# Patient Record
Sex: FEMALE | Race: WHITE | Employment: FULL TIME | URBAN - NONMETROPOLITAN AREA
[De-identification: names, ages, dates, MRNs, and addresses within clinical notes are randomized per-mention and may not be internally consistent; named-entity substitution may affect disease eponyms.]

---

## 2018-01-10 ENCOUNTER — HOSPITAL ENCOUNTER (OUTPATIENT)
Dept: WOMENS IMAGING | Age: 43
Discharge: HOME | End: 2018-01-10
Payer: COMMERCIAL

## 2018-01-10 DIAGNOSIS — Z12.31 VISIT FOR SCREENING MAMMOGRAM: ICD-10-CM

## 2018-01-10 PROCEDURE — 77063 BREAST TOMOSYNTHESIS BI: CPT

## 2019-01-28 ENCOUNTER — HOSPITAL ENCOUNTER (OUTPATIENT)
Dept: WOMENS IMAGING | Age: 44
Discharge: HOME | End: 2019-01-28
Payer: COMMERCIAL

## 2019-01-28 DIAGNOSIS — Z12.31 VISIT FOR SCREENING MAMMOGRAM: ICD-10-CM

## 2019-01-28 PROCEDURE — 77067 SCR MAMMO BI INCL CAD: CPT

## 2020-02-24 ENCOUNTER — HOSPITAL ENCOUNTER (OUTPATIENT)
Dept: WOMENS IMAGING | Age: 45
Discharge: HOME | End: 2020-02-24
Payer: COMMERCIAL

## 2020-02-24 PROCEDURE — 77063 BREAST TOMOSYNTHESIS BI: CPT

## 2021-03-01 ENCOUNTER — HOSPITAL ENCOUNTER (OUTPATIENT)
Dept: WOMENS IMAGING | Age: 46
Discharge: HOME | End: 2021-03-01
Payer: COMMERCIAL

## 2021-03-01 DIAGNOSIS — Z12.31 VISIT FOR SCREENING MAMMOGRAM: ICD-10-CM

## 2021-03-01 PROCEDURE — 77063 BREAST TOMOSYNTHESIS BI: CPT

## 2021-03-03 ENCOUNTER — HOSPITAL ENCOUNTER (OUTPATIENT)
Dept: WOMENS IMAGING | Age: 46
Discharge: HOME | End: 2021-03-03
Payer: COMMERCIAL

## 2021-03-03 DIAGNOSIS — R92.2 BREAST DENSITY: ICD-10-CM

## 2021-03-03 PROCEDURE — 76642 ULTRASOUND BREAST LIMITED: CPT

## 2021-03-04 ENCOUNTER — HOSPITAL ENCOUNTER (OUTPATIENT)
Dept: WOMENS IMAGING | Age: 46
Discharge: HOME | End: 2021-03-04
Payer: COMMERCIAL

## 2021-03-04 DIAGNOSIS — N63.20 BREAST MASS, LEFT: ICD-10-CM

## 2021-03-04 PROCEDURE — C1894 INTRO/SHEATH, NON-LASER: HCPCS

## 2021-03-04 PROCEDURE — 88305 TISSUE EXAM BY PATHOLOGIST: CPT

## 2021-03-04 PROCEDURE — 77065 DX MAMMO INCL CAD UNI: CPT

## 2021-03-04 RX ORDER — DILTIAZEM HYDROCHLORIDE 180 MG/1
180 CAPSULE, COATED, EXTENDED RELEASE ORAL DAILY
COMMUNITY

## 2021-03-04 RX ORDER — LISINOPRIL 10 MG/1
10 TABLET ORAL DAILY
COMMUNITY

## 2021-03-04 RX ORDER — ATORVASTATIN CALCIUM 20 MG/1
28 TABLET, FILM COATED ORAL DAILY
COMMUNITY

## 2021-03-04 NOTE — PROGRESS NOTES
Formulation and discussion of sedation / procedure plans, risks, benefits, side effects and alternatives with patient and/or responsible adult completed.     Electronically signed by Genesis Strickland MD on 3/4/2021 at 1:09 PM

## 2021-03-04 NOTE — PROGRESS NOTES
[facility] [address]  Pre-Biopsy Assessment      Patient Education    Written information about procedure Yes  left   Procedural steps explained Yes Ultrasound Biopsy   Post-op potential: bruising, hematoma, pain Yes    Self-care: activity, care of dressing Yes    Patient verbalized understanding Yes    Consent signed and witnessed Yes      Hormone Therapy Status: N/A    Recent Medication: Ibuprofen Last Dose: 3-3-21                                     Hormone Replacement Therapy: no    Previous Breast Biopsy: no    Previous Diagnosis Cancer: no    Hysterectomy:no    Emotional Status: Calm    Language or Physical Barriers: N/A    Comments: N/A      Electronically signed by Argenis Barriga on 3/4/2021 at 1:39 PM

## 2021-03-04 NOTE — PROGRESS NOTES
Breast Biopsy Flowsheet/Post-Operative Care    Date of Procedure: 3/4/2021  Physician: Dr. Bharat Florez  Technologist: Justina Da Silva RT(R)(M)    Biopsy:ultrasound guided breast biopsy  Lesion type: Non-palpable  Breast: left    Clock face position: Site #1: UOQ         Primary Method of Detection: Mammogram      Microcalcification's: no   Distribution: N/A    Asymmetry: asymmetric    Biopsy Method:   Sertera:    Site # 1    Gauge: 14    # of Passes: 4     Clip: Radha      Pre-Op Assessment: (BI-RADS)   4. Suspicious Abnormality    Patient Tolerated Procedure: good  Complications: N/A  Comments: N/A    Post Operative Care  Steri strips: Yes  Dressing: Gauze, Tape   Ice Applied to Site:  No  Evidence of Bleeding:  No    Pain Verbalized: No      Written Discharge Instructions: Yes  Condition at Discharge: good  Time of Discharge: 1335    Electronically signed by Giuliano Capellan on 3/4/2021 at 1:40 PM

## 2021-03-05 ENCOUNTER — CLINICAL DOCUMENTATION (OUTPATIENT)
Dept: WOMENS IMAGING | Age: 46
End: 2021-03-05

## 2021-03-05 NOTE — PROGRESS NOTES
Debbi Litten (Legal)     39 y.o., 1975            Contact Type: [facility]  Contact Information: Arrived alone for biopsy results. Dr. Nakia Newman discussed pathology and recommends excisional biopsy. All cards given for surgeons. Diagnosis: Intraductal Papilloma  Patient Expresses Need(s) For: wanting to ask her aunt about surgeons. Requests Referral to Doctor(s) with appointment(s): Undecided  Additional Referral(s): Randy Thomson/Audra Nava: [facility] for high risk status  Biopsy site status: Good  Updated Tyrer-Cuzick Score, if applicable: 02.8  Teaching sheets provided: Excisional biopsy, Needle localization, Papilloma  Notes: Explained terms doctor and navigators will discuss at next appointments. Questions addressed. Referral faxed to Navigation due to high risk status. Answered yes to any section on Hereditary Risk Assessment: yes  Additional information: N/A  Results faxed to:  Dr. Bobbi Estrella and Dr. Raulito Vogel.

## 2021-03-08 ENCOUNTER — CLINICAL DOCUMENTATION (OUTPATIENT)
Dept: WOMENS IMAGING | Age: 46
End: 2021-03-08

## 2021-03-08 NOTE — PROGRESS NOTES
Telephone :  Patient called back today at 12:50 pm.  She would like to see Dr. Ravinder Pineda regarding the left breast papilloma. I will make referral today for a consult appointment. Patient voices understanding.

## 2021-03-15 ENCOUNTER — OFFICE VISIT (OUTPATIENT)
Dept: SURGERY | Age: 46
End: 2021-03-15
Payer: COMMERCIAL

## 2021-03-15 VITALS
BODY MASS INDEX: 41.95 KG/M2 | WEIGHT: 293 LBS | HEART RATE: 68 BPM | RESPIRATION RATE: 15 BRPM | TEMPERATURE: 97.2 F | OXYGEN SATURATION: 98 % | DIASTOLIC BLOOD PRESSURE: 67 MMHG | HEIGHT: 70 IN | SYSTOLIC BLOOD PRESSURE: 116 MMHG

## 2021-03-15 DIAGNOSIS — D24.2 INTRADUCTAL PAPILLOMA OF LEFT BREAST: Primary | ICD-10-CM

## 2021-03-15 DIAGNOSIS — I10 ESSENTIAL HYPERTENSION: ICD-10-CM

## 2021-03-15 DIAGNOSIS — Z01.818 PRE-OP TESTING: ICD-10-CM

## 2021-03-15 DIAGNOSIS — E11.9 DIABETES MELLITUS WITHOUT COMPLICATION (HCC): ICD-10-CM

## 2021-03-15 PROCEDURE — G8484 FLU IMMUNIZE NO ADMIN: HCPCS | Performed by: SURGERY

## 2021-03-15 PROCEDURE — G8427 DOCREV CUR MEDS BY ELIG CLIN: HCPCS | Performed by: SURGERY

## 2021-03-15 PROCEDURE — 99243 OFF/OP CNSLTJ NEW/EST LOW 30: CPT | Performed by: SURGERY

## 2021-03-15 PROCEDURE — 2022F DILAT RTA XM EVC RTNOPTHY: CPT | Performed by: SURGERY

## 2021-03-15 PROCEDURE — G8417 CALC BMI ABV UP PARAM F/U: HCPCS | Performed by: SURGERY

## 2021-03-15 SDOH — HEALTH STABILITY: MENTAL HEALTH: HOW OFTEN DO YOU HAVE A DRINK CONTAINING ALCOHOL?: NOT ASKED

## 2021-03-15 SDOH — ECONOMIC STABILITY: FOOD INSECURITY: WITHIN THE PAST 12 MONTHS, THE FOOD YOU BOUGHT JUST DIDN'T LAST AND YOU DIDN'T HAVE MONEY TO GET MORE.: NOT ASKED

## 2021-03-15 SDOH — ECONOMIC STABILITY: FOOD INSECURITY: WITHIN THE PAST 12 MONTHS, YOU WORRIED THAT YOUR FOOD WOULD RUN OUT BEFORE YOU GOT MONEY TO BUY MORE.: NOT ASKED

## 2021-03-15 SDOH — ECONOMIC STABILITY: TRANSPORTATION INSECURITY
IN THE PAST 12 MONTHS, HAS THE LACK OF TRANSPORTATION KEPT YOU FROM MEDICAL APPOINTMENTS OR FROM GETTING MEDICATIONS?: NOT ASKED

## 2021-03-15 ASSESSMENT — ENCOUNTER SYMPTOMS
VOMITING: 0
COUGH: 0
COLOR CHANGE: 0
WHEEZING: 0
NAUSEA: 0
SORE THROAT: 0
SHORTNESS OF BREATH: 0
BLOOD IN STOOL: 0
ABDOMINAL PAIN: 0

## 2021-03-15 NOTE — LETTER
[address] [facility]  [place] [address] [facility] [address]  Phone: [phone]  Fax: [phone]    Monisha Roche MD        March 15, 2021    Patient: Jennifer Cabral   MR Number: [id number]   YOB: 1975   Date of Visit: 3/15/2021     Dear Dr. Ktahy Decker,    Thank you for the request for consultation for Jennifer Cabral to me for the evaluation of recent abnormal left mammogram and biopsy. Below are the relevant portions of my assessment and plan of care. 1. Intraductal papilloma of left breast    2. Diabetes mellitus without complication ([facility] [address]    3. Pre-op testing    4. Essential hypertension    4. Ectatic duct associated with intraductal papilloma    1. Schedule patient for needle localization lumpectomy of the left breast.  Excision recommended by radiology discordant findings  2. MAC anesthesia  3. Preoperative testing per anesthesia guidelines  4. Risk of procedure were discussed with the patient and include but not limited to bleeding, infection, hematoma as well as seroma and scar. Discussed with patient intraductal papilloma in and of itself is benign however excision recommended if any discordant findings. Associated ectatic duct. If you have questions, please do not hesitate to call me. I look forward to following Jossie along with you.     Sincerely,          Monisha Roche MD

## 2021-03-15 NOTE — PROGRESS NOTES
Dante Montague MD   General Surgery  New Patient Evaluation in Office  Pt Name: Afshan Roblero  Date of Birth 1975   Today's Date: 3/15/2021  Medical Record Number: [id number]  Referring Provider: Linden Collins MD  Primary Care Provider: Janey Kim  Chief Complaint:  Chief Complaint   Patient presents with    Surgical Consult     new patient--ref by [address] for left breast intraductal papilloma-needs excisional bx       ASSESSMENT      1. Intraductal papilloma of left breast    2. Diabetes mellitus without complication ([address]    3. Pre-op testing    4. Essential hypertension    4. Ectatic duct associated with intraductal papilloma   5. Increased lifetime risk of invasive breast cancer utilizing screening tool  6. BMI 49.93  PLANS      1. Schedule patient for needle localization lumpectomy of the left breast.  Excision recommended by radiology discordant findings  2. MAC anesthesia  3. Preoperative testing per anesthesia guidelines  4. Risk of procedure were discussed with the patient and include but not limited to bleeding, infection, hematoma as well as seroma and scar. Discussed with patient intraductal papilloma in and of itself is benign however excision recommended if any discordant findings. Associated ectatic duct. Ajay Mcintyre is a 39y.o. year old female who is presenting today in the office for evaluation after recent abnormal screening mammogram and subsequent ultrasound guided biopsy. States she has had no previous breast symptoms and has not had a previous breast biopsy. She has been getting regular screening mammograms since age 36. On recent screen revealed an irregular ectatic duct in the left breast with an indistinct margin. This prompted an ultrasound which showed an area of duct ectasia in the upper outer anterior aspect of the left breast.  Near this there was an irregularly-shaped nodule 8 x 3 x 6 mm.   There were no abnormality seen in the left axilla. An ultrasound-guided biopsy was performed. Pathology revealed intraductal papilloma with usual intraductal hyperplasia. However no breast tissue lobules were seen. No atypical epithelium was seen. Given patient's family history and larger size of the lesion at 8 mm I concur with recommendation for excision. Past Medical History  Past Medical History:   Diagnosis Date    Diabetes mellitus ([address]     type 2    High blood pressure     Sleep apnea        Past Surgical History  Past Surgical History:   Procedure Laterality Date     SECTION      KNEE SURGERY      [facility] US GUID NDL BIOPSY LEFT Left 2021    [facility] US GUID NDL BIOPSY LEFT 3/4/2021 [facility]    SINUS SURGERY         Medications  Current Outpatient Medications   Medication Sig Dispense Refill    dilTIAZem (DILTIAZEM CD) 180 MG extended release capsule Take 180 mg by mouth daily      lisinopril (PRINIVIL;ZESTRIL) 10 MG tablet Take 10 mg by mouth daily      atorvastatin (LIPITOR) 20 MG tablet Take 28 mg by mouth daily      metFORMIN (GLUCOPHAGE) 1000 MG tablet Take 1,000 mg by mouth 2 times daily (with meals).  sertraline (ZOLOFT) 50 MG tablet Take 50 mg by mouth daily. No current facility-administered medications for this visit.       Allergies     Allergies   Allergen Reactions    Sulfa Antibiotics Swelling       Family History  Family History   Problem Relation Age of Onset    High Blood Pressure Mother     High Blood Pressure Father     Breast Cancer Maternal Aunt 61    Breast Cancer Maternal Grandmother 67    Breast Cancer Paternal Grandmother 55       SocialHistory  Social History     Socioeconomic History    Marital status:      Spouse name: Not on file    Number of children: 1    Years of education: Not on file    Highest education level: Not on file   Occupational History    Not on file   Social Needs    Financial resource strain: Not on file    Food insecurity Worry: Not on file     Inability: Not on file    Transportation needs     Medical: Not on file     Non-medical: Not on file   Tobacco Use    Smoking status: Never Smoker    Smokeless tobacco: Never Used   Substance and Sexual Activity    Alcohol use: Yes     Comment: occasional    Drug use: Never    Sexual activity: Yes     Partners: Male   Lifestyle    Physical activity     Days per week: Not on file     Minutes per session: Not on file    Stress: Not on file   Relationships    Social connections     Talks on phone: Not on file     Gets together: Not on file     Attends Faith service: Not on file     Active member of club or organization: Not on file     Attends meetings of clubs or organizations: Not on file     Relationship status: Not on file    Intimate partner violence     Fear of current or ex partner: Not on file     Emotionally abused: Not on file     Physically abused: Not on file     Forced sexual activity: Not on file   Other Topics Concern    Not on file   Social History Narrative    Not on file           Review of Systems  Review of Systems   Constitutional: Negative for chills, fatigue, fever and unexpected weight change. Weight gain   HENT: Negative for sore throat. Eyes: Negative for visual disturbance. Respiratory: Negative for cough, shortness of breath and wheezing. Cardiovascular: Negative for chest pain and palpitations. Gastrointestinal: Negative for abdominal pain, blood in stool, nausea and vomiting. Endocrine: Negative for cold intolerance, heat intolerance and polydipsia. Genitourinary: Negative for dysuria, flank pain and hematuria. Musculoskeletal: Negative for arthralgias, gait problem, joint swelling and myalgias. Skin: Negative for color change and rash. Allergic/Immunologic: Negative for immunocompromised state. Neurological: Negative for dizziness, tremors, seizures and speech difficulty. Hematological: Does not bruise/bleed easily. Psychiatric/Behavioral: Negative for behavioral problems and confusion. OBJECTIVE     /67 (Site: Left Lower Arm, Position: Sitting, Cuff Size: Medium Adult)   Pulse 68   Temp 97.2 °F (36.2 °C) (Tympanic)   Resp 15   Ht 5' 10\" (1.778 m)   Wt (!) 348 lb (157.9 kg)   LMP 02/14/2021 (Approximate)   SpO2 98%   Breastfeeding No   BMI 49.93 kg/m²      Physical Exam  Constitutional:       Appearance: Normal appearance. She is well-developed. She is obese. She is not ill-appearing or diaphoretic. HENT:      Head: Normocephalic and atraumatic. Nose: No congestion. Eyes:      General: No scleral icterus. Extraocular Movements: Extraocular movements intact. Pupils: Pupils are equal, round, and reactive to light. Neck:      Musculoskeletal: Neck supple. Vascular: No JVD. Trachea: No tracheal deviation. Cardiovascular:      Rate and Rhythm: Normal rate and regular rhythm. Heart sounds: Normal heart sounds. No murmur. Pulmonary:      Effort: Pulmonary effort is normal. No respiratory distress. Breath sounds: No wheezing. Chest:      Chest wall: No tenderness. Breasts:         Right: No inverted nipple, mass, nipple discharge, skin change or tenderness. Left: No inverted nipple, mass, nipple discharge, skin change or tenderness. Abdominal:      General: There is no distension. Palpations: There is no mass. Tenderness: There is no abdominal tenderness. Musculoskeletal:         General: No deformity. Lymphadenopathy:      Cervical: No cervical adenopathy. Right cervical: No superficial, deep or posterior cervical adenopathy. Left cervical: No superficial, deep or posterior cervical adenopathy. Upper Body:      Right upper body: No supraclavicular, axillary or pectoral adenopathy. Left upper body: No axillary or pectoral adenopathy. Skin:     General: Skin is warm and dry. Coloration: Skin is not jaundiced or pale. Examination:   The core fragment demonstrates an intraductal papilloma with usual   intraductal hyperplasia.  Fibrovascular cores are well-defined.  The   epithelium is not atypical.  No breast lobules are present in the core   biopsy.  Please clinically correlate with mammographic findings. [id number]                                                       <Sign Out Dr. Jo Vazquez M.D., F.C.A.P. NVML/ [address] [facility]  Printed on:  3/5/2021   Delia Maurer [address]   [address] [facility], [facility]   Original print date: 03/05/2021              IMPRESSION: Mammogram BI-RADS: Category 0: Incomplete: Needs    Additional Imaging Evaluation       The irregular ectatic duct in the left breast appears    indeterminate.  An ultrasound is recommended.             Joy Phi calculations report this patient's 10-year risk for    developing breast cancer at 6.1% and lifetime risk at 30.4%. Based on this assessment tool, this patient's calculated lifetime   Ardella Bream is at or above 20%  and they may be a candidate for Breast    MRI screening per the [address]    The patient has been or will be contacted.         #[id number] - ARNAV JEN DIGITAL SCREEN SELF REFERRAL W OR WO CAD    BILATERAL   BILATERAL DIGITAL SCREENING MAMMOGRAM 3D/2D WITH CAD: 3/1/2021   CLINICAL: Tomosynthesis.  Self referred screening mammogram.         Comparison is made to exams dated:  2/24/2020 mammogram and    1/28/2019 mammogram - [address] [place] [address].     There are scattered fibroglandular elements in both breasts that    could obscure a lesion on mammography.     Current study was also evaluated with a Computer Aided Detection    (CAD) system.     There is an irregular ectatic duct with an indistinct margin in    the left breast upper outer aspect anterior depth.  This is more    prominent.     No other significant masses, calcifications, or other findings    are seen in either breast.                 Dr. Mishra Mention             nn/:3/1/2021 12:34:03     copy to: Saumya Laboy M.D., [facility],    [address], ph: [phone], fax: [phone]       Imaging Technologist: Hilda Mendieta, [facility]   letter sent: Additional Tests Needed     [id number]            IMPRESSION: Ultrasound BI-RADS: Category 4B: Suspicious    Abnormality: Moderate suspicion for malignancy   The irregular nodule with adjacent duct ectasia in the left    breast appear to have an intermediate suspicion for malignancy.     An ultrasound guided biopsy is recommended.     The results were reviewed with the patient.     The patient has been or will be contacted.         #[id number] - US BREAST LIMITED LEFT   ULTRASOUND OF LEFT BREAST AND LEFT AXILLA: 3/3/2021   CLINICAL: Rt breast density.         Comparison is made to exam dated:  3/1/2021 mammogram - [address]     Real-time ultrasound of the left breast and axilla was performed    on the areas of interest.  Gray scale images of the real-time    examination were reviewed.     There is an area of duct ectasia in the left breast upper outer    aspect anterior depth.  These duct ectasia display posterior    acoustic enhancement.  These correlate with mammography findings.     Near this, there is an irregularly shaped nodule measuring 8 x    3 x 6 mm.       No abnormalities were seen sonographically in the left axilla.         Dr. Mishra Mention     nn/:3/3/2021 11:47:15     copy to: Saumya Laboy M.D., [facility],    [address], ph: [phone], fax: [phone]       Imaging Technologist: Robyn Curry RT(R)(M), [address] [place] [address]   letter sent: Additional Tests Needed        [id number]               Imaging reviewed with patient

## 2021-03-30 ENCOUNTER — HOSPITAL ENCOUNTER (OUTPATIENT)
Age: 46
Discharge: HOME | End: 2021-03-30
Payer: COMMERCIAL

## 2021-03-30 DIAGNOSIS — E11.9 DIABETES MELLITUS WITHOUT COMPLICATION (HCC): ICD-10-CM

## 2021-03-30 DIAGNOSIS — Z01.818 PRE-OP TESTING: ICD-10-CM

## 2021-03-30 DIAGNOSIS — D24.2 INTRADUCTAL PAPILLOMA OF LEFT BREAST: ICD-10-CM

## 2021-03-30 LAB
ANION GAP SERPL CALCULATED.3IONS-SCNC: 12 MEQ/L (ref 8–16)
BUN BLDV-MCNC: 12 MG/DL (ref 7–22)
CALCIUM SERPL-MCNC: 9.7 MG/DL (ref 8.5–10.5)
CHLORIDE BLD-SCNC: 104 MEQ/L (ref 98–111)
CO2: 21 MEQ/L (ref 23–33)
CREAT SERPL-MCNC: 0.5 MG/DL (ref 0.4–1.2)
EKG ATRIAL RATE: 57 BPM
EKG P AXIS: 55 DEGREES
EKG P-R INTERVAL: 152 MS
EKG Q-T INTERVAL: 432 MS
EKG QRS DURATION: 88 MS
EKG QTC CALCULATION (BAZETT): 420 MS
EKG R AXIS: 27 DEGREES
EKG T AXIS: -5 DEGREES
EKG VENTRICULAR RATE: 57 BPM
GFR SERPL CREATININE-BSD FRML MDRD: > 90 ML/MIN/1.73M2
GLUCOSE BLD-MCNC: 142 MG/DL (ref 70–108)
HCT VFR BLD CALC: 44 % (ref 37–47)
HEMOGLOBIN: 13.4 GM/DL (ref 12–16)
POTASSIUM SERPL-SCNC: 4.4 MEQ/L (ref 3.5–5.2)
SODIUM BLD-SCNC: 137 MEQ/L (ref 135–145)

## 2021-03-30 PROCEDURE — 36415 COLL VENOUS BLD VENIPUNCTURE: CPT

## 2021-03-30 PROCEDURE — 85018 HEMOGLOBIN: CPT

## 2021-03-30 PROCEDURE — 93010 ELECTROCARDIOGRAM REPORT: CPT | Performed by: INTERNAL MEDICINE

## 2021-03-30 PROCEDURE — 80048 BASIC METABOLIC PNL TOTAL CA: CPT

## 2021-03-30 PROCEDURE — 93005 ELECTROCARDIOGRAM TRACING: CPT

## 2021-03-30 PROCEDURE — 85014 HEMATOCRIT: CPT

## 2021-04-02 ENCOUNTER — HOSPITAL ENCOUNTER (OUTPATIENT)
Age: 46
Discharge: HOME | End: 2021-04-02
Payer: COMMERCIAL

## 2021-04-02 PROCEDURE — U0003 INFECTIOUS AGENT DETECTION BY NUCLEIC ACID (DNA OR RNA); SEVERE ACUTE RESPIRATORY SYNDROME CORONAVIRUS 2 (SARS-COV-2) (CORONAVIRUS DISEASE [COVID-19]), AMPLIFIED PROBE TECHNIQUE, MAKING USE OF HIGH THROUGHPUT TECHNOLOGIES AS DESCRIBED BY CMS-2020-01-R: HCPCS

## 2021-04-02 PROCEDURE — U0005 INFEC AGEN DETEC AMPLI PROBE: HCPCS

## 2021-04-05 LAB
SARS-COV-2: NOT DETECTED
SOURCE: NORMAL

## 2021-04-09 ENCOUNTER — ANESTHESIA (OUTPATIENT)
Dept: OPERATING ROOM | Age: 46
End: 2021-04-09
Payer: COMMERCIAL

## 2021-04-09 ENCOUNTER — HOSPITAL ENCOUNTER (OUTPATIENT)
Dept: WOMENS IMAGING | Age: 46
Discharge: HOME | End: 2021-04-09
Attending: SURGERY
Payer: COMMERCIAL

## 2021-04-09 ENCOUNTER — HOSPITAL ENCOUNTER (OUTPATIENT)
Dept: WOMENS IMAGING | Age: 46
Discharge: HOME | End: 2021-04-09
Payer: COMMERCIAL

## 2021-04-09 ENCOUNTER — ANESTHESIA EVENT (OUTPATIENT)
Dept: OPERATING ROOM | Age: 46
End: 2021-04-09
Payer: COMMERCIAL

## 2021-04-09 ENCOUNTER — HOSPITAL ENCOUNTER (OUTPATIENT)
Age: 46
Setting detail: OUTPATIENT SURGERY
Discharge: HOME | End: 2021-04-09
Attending: SURGERY | Admitting: SURGERY
Payer: COMMERCIAL

## 2021-04-09 VITALS
RESPIRATION RATE: 10 BRPM | SYSTOLIC BLOOD PRESSURE: 123 MMHG | DIASTOLIC BLOOD PRESSURE: 61 MMHG | OXYGEN SATURATION: 94 %

## 2021-04-09 VITALS
DIASTOLIC BLOOD PRESSURE: 67 MMHG | HEIGHT: 69 IN | OXYGEN SATURATION: 97 % | SYSTOLIC BLOOD PRESSURE: 129 MMHG | RESPIRATION RATE: 16 BRPM | WEIGHT: 293 LBS | HEART RATE: 66 BPM | TEMPERATURE: 98 F | BODY MASS INDEX: 43.4 KG/M2

## 2021-04-09 DIAGNOSIS — D24.2 PAPILLOMA OF LEFT BREAST: ICD-10-CM

## 2021-04-09 DIAGNOSIS — D24.2 INTRADUCTAL PAPILLOMA OF LEFT BREAST: ICD-10-CM

## 2021-04-09 DIAGNOSIS — D24.2 PAPILLOMA OF LEFT BREAST: Primary | ICD-10-CM

## 2021-04-09 LAB
GLUCOSE BLD-MCNC: 120 MG/DL (ref 70–108)
PREGNANCY, URINE: NEGATIVE

## 2021-04-09 PROCEDURE — 82948 REAGENT STRIP/BLOOD GLUCOSE: CPT

## 2021-04-09 PROCEDURE — G0279 TOMOSYNTHESIS, MAMMO: HCPCS

## 2021-04-09 PROCEDURE — 7100000011 HC PHASE II RECOVERY - ADDTL 15 MIN: Performed by: SURGERY

## 2021-04-09 PROCEDURE — 3600000012 HC SURGERY LEVEL 2 ADDTL 15MIN: Performed by: SURGERY

## 2021-04-09 PROCEDURE — 6370000000 HC RX 637 (ALT 250 FOR IP)

## 2021-04-09 PROCEDURE — 2500000003 HC RX 250 WO HCPCS: Performed by: NURSE ANESTHETIST, CERTIFIED REGISTERED

## 2021-04-09 PROCEDURE — 2709999900 HC NON-CHARGEABLE SUPPLY: Performed by: SURGERY

## 2021-04-09 PROCEDURE — 3700000001 HC ADD 15 MINUTES (ANESTHESIA): Performed by: SURGERY

## 2021-04-09 PROCEDURE — 88307 TISSUE EXAM BY PATHOLOGIST: CPT

## 2021-04-09 PROCEDURE — 2500000003 HC RX 250 WO HCPCS: Performed by: SURGERY

## 2021-04-09 PROCEDURE — 76098 X-RAY EXAM SURGICAL SPECIMEN: CPT

## 2021-04-09 PROCEDURE — 2580000003 HC RX 258: Performed by: SURGERY

## 2021-04-09 PROCEDURE — 19301 PARTIAL MASTECTOMY: CPT | Performed by: SURGERY

## 2021-04-09 PROCEDURE — 81025 URINE PREGNANCY TEST: CPT

## 2021-04-09 PROCEDURE — 3600000002 HC SURGERY LEVEL 2 BASE: Performed by: SURGERY

## 2021-04-09 PROCEDURE — 19285 PERQ DEV BREAST 1ST US IMAG: CPT

## 2021-04-09 PROCEDURE — 7100000010 HC PHASE II RECOVERY - FIRST 15 MIN: Performed by: SURGERY

## 2021-04-09 PROCEDURE — 6360000002 HC RX W HCPCS: Performed by: NURSE ANESTHETIST, CERTIFIED REGISTERED

## 2021-04-09 PROCEDURE — 6360000002 HC RX W HCPCS: Performed by: SURGERY

## 2021-04-09 PROCEDURE — 3700000000 HC ANESTHESIA ATTENDED CARE: Performed by: SURGERY

## 2021-04-09 RX ORDER — ONDANSETRON 2 MG/ML
INJECTION INTRAMUSCULAR; INTRAVENOUS PRN
Status: DISCONTINUED | OUTPATIENT
Start: 2021-04-09 | End: 2021-04-09 | Stop reason: SDUPTHER

## 2021-04-09 RX ORDER — MIDAZOLAM HYDROCHLORIDE 2 MG/2ML
INJECTION, SOLUTION INTRAMUSCULAR; INTRAVENOUS PRN
Status: DISCONTINUED | OUTPATIENT
Start: 2021-04-09 | End: 2021-04-09 | Stop reason: SDUPTHER

## 2021-04-09 RX ORDER — DEXAMETHASONE SODIUM PHOSPHATE 10 MG/ML
INJECTION, EMULSION INTRAMUSCULAR; INTRAVENOUS PRN
Status: DISCONTINUED | OUTPATIENT
Start: 2021-04-09 | End: 2021-04-09 | Stop reason: SDUPTHER

## 2021-04-09 RX ORDER — SODIUM CHLORIDE 9 MG/ML
25 INJECTION, SOLUTION INTRAVENOUS PRN
Status: DISCONTINUED | OUTPATIENT
Start: 2021-04-09 | End: 2021-04-09 | Stop reason: HOSPADM

## 2021-04-09 RX ORDER — SODIUM CHLORIDE 0.9 % (FLUSH) 0.9 %
5-40 SYRINGE (ML) INJECTION EVERY 12 HOURS SCHEDULED
Status: DISCONTINUED | OUTPATIENT
Start: 2021-04-09 | End: 2021-04-09 | Stop reason: HOSPADM

## 2021-04-09 RX ORDER — PROPOFOL 10 MG/ML
INJECTION, EMULSION INTRAVENOUS CONTINUOUS PRN
Status: DISCONTINUED | OUTPATIENT
Start: 2021-04-09 | End: 2021-04-09 | Stop reason: SDUPTHER

## 2021-04-09 RX ORDER — SCOLOPAMINE TRANSDERMAL SYSTEM 1 MG/1
PATCH, EXTENDED RELEASE TRANSDERMAL
Status: COMPLETED
Start: 2021-04-09 | End: 2021-04-09

## 2021-04-09 RX ORDER — HYDROCODONE BITARTRATE AND ACETAMINOPHEN 5; 325 MG/1; MG/1
1 TABLET ORAL EVERY 6 HOURS PRN
Qty: 12 TABLET | Refills: 0 | Status: SHIPPED | OUTPATIENT
Start: 2021-04-09 | End: 2021-04-12

## 2021-04-09 RX ORDER — MORPHINE SULFATE 2 MG/ML
4 INJECTION, SOLUTION INTRAMUSCULAR; INTRAVENOUS
Status: DISCONTINUED | OUTPATIENT
Start: 2021-04-09 | End: 2021-04-09 | Stop reason: HOSPADM

## 2021-04-09 RX ORDER — PROPOFOL 10 MG/ML
INJECTION, EMULSION INTRAVENOUS PRN
Status: DISCONTINUED | OUTPATIENT
Start: 2021-04-09 | End: 2021-04-09 | Stop reason: SDUPTHER

## 2021-04-09 RX ORDER — FENTANYL CITRATE 50 UG/ML
INJECTION, SOLUTION INTRAMUSCULAR; INTRAVENOUS PRN
Status: DISCONTINUED | OUTPATIENT
Start: 2021-04-09 | End: 2021-04-09 | Stop reason: SDUPTHER

## 2021-04-09 RX ORDER — BUPIVACAINE HYDROCHLORIDE 5 MG/ML
INJECTION, SOLUTION EPIDURAL; INTRACAUDAL PRN
Status: DISCONTINUED | OUTPATIENT
Start: 2021-04-09 | End: 2021-04-09 | Stop reason: ALTCHOICE

## 2021-04-09 RX ORDER — PROMETHAZINE HYDROCHLORIDE 25 MG/1
12.5 TABLET ORAL EVERY 6 HOURS PRN
Status: DISCONTINUED | OUTPATIENT
Start: 2021-04-09 | End: 2021-04-09 | Stop reason: HOSPADM

## 2021-04-09 RX ORDER — LIDOCAINE HYDROCHLORIDE AND EPINEPHRINE 10; 10 MG/ML; UG/ML
INJECTION, SOLUTION INFILTRATION; PERINEURAL PRN
Status: DISCONTINUED | OUTPATIENT
Start: 2021-04-09 | End: 2021-04-09 | Stop reason: ALTCHOICE

## 2021-04-09 RX ORDER — SODIUM CHLORIDE 9 MG/ML
INJECTION, SOLUTION INTRAVENOUS CONTINUOUS
Status: DISCONTINUED | OUTPATIENT
Start: 2021-04-09 | End: 2021-04-09 | Stop reason: HOSPADM

## 2021-04-09 RX ORDER — HYDROCODONE BITARTRATE AND ACETAMINOPHEN 5; 325 MG/1; MG/1
2 TABLET ORAL EVERY 4 HOURS PRN
Status: DISCONTINUED | OUTPATIENT
Start: 2021-04-09 | End: 2021-04-09 | Stop reason: HOSPADM

## 2021-04-09 RX ORDER — ONDANSETRON 2 MG/ML
4 INJECTION INTRAMUSCULAR; INTRAVENOUS EVERY 6 HOURS PRN
Status: DISCONTINUED | OUTPATIENT
Start: 2021-04-09 | End: 2021-04-09 | Stop reason: HOSPADM

## 2021-04-09 RX ORDER — SODIUM CHLORIDE 0.9 % (FLUSH) 0.9 %
5-40 SYRINGE (ML) INJECTION PRN
Status: DISCONTINUED | OUTPATIENT
Start: 2021-04-09 | End: 2021-04-09 | Stop reason: HOSPADM

## 2021-04-09 RX ORDER — SCOLOPAMINE TRANSDERMAL SYSTEM 1 MG/1
1 PATCH, EXTENDED RELEASE TRANSDERMAL ONCE
Status: DISCONTINUED | OUTPATIENT
Start: 2021-04-09 | End: 2021-04-09 | Stop reason: HOSPADM

## 2021-04-09 RX ORDER — MORPHINE SULFATE 2 MG/ML
2 INJECTION, SOLUTION INTRAMUSCULAR; INTRAVENOUS
Status: DISCONTINUED | OUTPATIENT
Start: 2021-04-09 | End: 2021-04-09 | Stop reason: HOSPADM

## 2021-04-09 RX ORDER — ACETAMINOPHEN 325 MG/1
650 TABLET ORAL EVERY 4 HOURS PRN
Status: DISCONTINUED | OUTPATIENT
Start: 2021-04-09 | End: 2021-04-09 | Stop reason: HOSPADM

## 2021-04-09 RX ORDER — HYDROCODONE BITARTRATE AND ACETAMINOPHEN 5; 325 MG/1; MG/1
1 TABLET ORAL EVERY 4 HOURS PRN
Status: DISCONTINUED | OUTPATIENT
Start: 2021-04-09 | End: 2021-04-09 | Stop reason: HOSPADM

## 2021-04-09 RX ORDER — LIDOCAINE HYDROCHLORIDE 20 MG/ML
INJECTION, SOLUTION EPIDURAL; INFILTRATION; INTRACAUDAL; PERINEURAL PRN
Status: DISCONTINUED | OUTPATIENT
Start: 2021-04-09 | End: 2021-04-09 | Stop reason: SDUPTHER

## 2021-04-09 RX ADMIN — PROPOFOL 40 MG: 10 INJECTION, EMULSION INTRAVENOUS at 13:42

## 2021-04-09 RX ADMIN — MIDAZOLAM HYDROCHLORIDE 2 MG: 1 INJECTION, SOLUTION INTRAMUSCULAR; INTRAVENOUS at 13:36

## 2021-04-09 RX ADMIN — FENTANYL CITRATE 50 MCG: 50 INJECTION, SOLUTION INTRAMUSCULAR; INTRAVENOUS at 13:55

## 2021-04-09 RX ADMIN — FENTANYL CITRATE 25 MCG: 50 INJECTION, SOLUTION INTRAMUSCULAR; INTRAVENOUS at 14:00

## 2021-04-09 RX ADMIN — ONDANSETRON HYDROCHLORIDE 4 MG: 4 INJECTION, SOLUTION INTRAMUSCULAR; INTRAVENOUS at 14:03

## 2021-04-09 RX ADMIN — Medication 3000 MG: at 13:42

## 2021-04-09 RX ADMIN — FENTANYL CITRATE 50 MCG: 50 INJECTION, SOLUTION INTRAMUSCULAR; INTRAVENOUS at 13:39

## 2021-04-09 RX ADMIN — LIDOCAINE HYDROCHLORIDE 80 MG: 20 INJECTION, SOLUTION EPIDURAL; INFILTRATION; INTRACAUDAL; PERINEURAL at 13:41

## 2021-04-09 RX ADMIN — PROPOFOL 100 MCG/KG/MIN: 10 INJECTION, EMULSION INTRAVENOUS at 13:42

## 2021-04-09 RX ADMIN — DEXAMETHASONE SODIUM PHOSPHATE 10 MG: 10 INJECTION, EMULSION INTRAMUSCULAR; INTRAVENOUS at 13:49

## 2021-04-09 RX ADMIN — SODIUM CHLORIDE: 9 INJECTION, SOLUTION INTRAVENOUS at 13:36

## 2021-04-09 ASSESSMENT — PULMONARY FUNCTION TESTS
PIF_VALUE: 0
PIF_VALUE: 1

## 2021-04-09 NOTE — PROGRESS NOTES
Formulation and discussion of sedation / procedure plans, risks, benefits, side effects and alternatives with patient and/or responsible adult completed.     Electronically signed by Jhonny Warner MD on 4/9/2021 at 11:59 AM

## 2021-04-09 NOTE — BRIEF OP NOTE
Brief Postoperative Note      Patient: Sherri Miramontes  YOB: 1975  MRN: [id number]    Date of Procedure: 4/9/2021    Pre-Op Diagnosis: LEFT BREAST PAPILLOMA, discordant imaging    Post-Op Diagnosis: Same       Procedure(s):  LEFT BREAST LUMPECTOMY, PRE-OP NEEDLE LOC    Surgeon(s):  Hannah Cabrera MD    Assistant:  * No surgical staff found *    Anesthesia: Monitor Anesthesia Care    Estimated Blood Loss (mL): Minimal    Complications: None    Specimens:   ID Type Source Tests Collected by Time Destination   A : LEFT BREAST LUMP Tissue Breast SURGICAL [address], MD 4/9/2021 4395        Implants:  * No implants in log *      Drains: * No LDAs found *    Findings:     Electronically signed by Hannah Cabrera MD on 4/9/2021 at 2:12 PM

## 2021-04-09 NOTE — ANESTHESIA PRE PROCEDURE
Department of Anesthesiology  Preprocedure Note       Name:  Leny Gomez   Age:  39 y.o.  :  1975                                          MRN:  [id number]         Date:  2021      Surgeon: Suresh Guardado):  Deejay Hartmann MD    Procedure: Procedure(s):  LEFT BREAST LUMPECTOMY, PRE-OP NEEDLE LOC    Medications prior to admission:   Prior to Admission medications    Medication Sig Start Date End Date Taking? Authorizing Provider   dilTIAZem (DILTIAZEM CD) 180 MG extended release capsule Take 180 mg by mouth daily   Yes Historical Provider, MD   lisinopril (PRINIVIL;ZESTRIL) 10 MG tablet Take 10 mg by mouth daily   Yes Historical Provider, MD   atorvastatin (LIPITOR) 20 MG tablet Take 28 mg by mouth daily   Yes Historical Provider, MD   sertraline (ZOLOFT) 50 MG tablet Take 50 mg by mouth daily. Yes Historical Provider, MD   metFORMIN (GLUCOPHAGE) 1000 MG tablet Take 1,000 mg by mouth 2 times daily (with meals). Historical Provider, MD       Current medications:    Current Facility-Administered Medications   Medication Dose Route Frequency Provider Last Rate Last Admin    0.9 % sodium chloride infusion   Intravenous Continuous Deejay Hartmann MD        sodium chloride flush 0.9 % injection 5-40 mL  5-40 mL Intravenous 2 times per day Deejay Hartmann MD        sodium chloride flush 0.9 % injection 5-40 mL  5-40 mL Intravenous PRN Deejay Hartmann MD        0.9 % sodium chloride infusion  25 mL Intravenous PRN Deejay Hartmann MD        ceFAZolin (ANCEF) 3000 mg in dextrose 5 % 100 mL IVPB  3,000 mg Intravenous On Call to [address] [place] [address], MD           Allergies: Allergies   Allergen Reactions    Sulfa Antibiotics Swelling       Problem List:  There is no problem list on file for this patient.       Past Medical History:        Diagnosis Date    Diabetes mellitus ([address]     type 2    High blood pressure     Sleep apnea        Past Surgical History:        Procedure Laterality Date     SECTION      KNEE SURGERY  2018    [facility] US GUID NDL BIOPSY LEFT Left 03/04/2021    [address] LEFT 3/4/2021 [facility]    SINUS SURGERY         Social History:    Social History     Tobacco Use    Smoking status: Never Smoker    Smokeless tobacco: Never Used   Substance Use Topics    Alcohol use: Yes     Comment: occasional                                Counseling given: Not Answered      Vital Signs (Current):   Vitals:    04/09/21 1251   BP: (!) 146/64   Pulse: 65   Resp: 16   Temp: 97.9 °F (36.6 °C)   TempSrc: Temporal   SpO2: 98%   Weight: (!) 348 lb 3.2 oz (157.9 kg)   Height: 5' 9\" (1.753 m)                                              BP Readings from Last 3 Encounters:   04/09/21 (!) 146/64   03/15/21 116/67   09/22/16 174/79       NPO Status: Time of last liquid consumption: 1200(sip )                        Time of last solid consumption: 2200                        Date of last liquid consumption: 04/09/21                        Date of last solid food consumption: 04/08/21    BMI:   Wt Readings from Last 3 Encounters:   04/09/21 (!) 348 lb 3.2 oz (157.9 kg)   03/15/21 (!) 348 lb (157.9 kg)   09/22/16 (!) 342 lb (155.1 kg)     Body mass index is 51.42 kg/m². CBC:   Lab Results   Component Value Date    HGB 13.4 03/30/2021    HCT 44.0 03/30/2021       CMP:   Lab Results   Component Value Date     03/30/2021    K 4.4 03/30/2021     03/30/2021    CO2 21 03/30/2021    BUN 12 03/30/2021    CREATININE 0.5 03/30/2021    LABGLOM >90 03/30/2021    GLUCOSE 142 03/30/2021    CALCIUM 9.7 03/30/2021       POC Tests: No results for input(s): POCGLU, POCNA, POCK, POCCL, POCBUN, POCHEMO, POCHCT in the last 72 hours.     Coags: No results found for: PROTIME, INR, APTT    HCG (If Applicable):   Lab Results   Component Value Date    PREGTESTUR negative 04/09/2021        ABGs: No results found for: PHART, PO2ART, QIU0UKM, RZK5GHH, BEART, O3GPRJEL     Type & Screen (If Applicable):  No results found for: Cornell Mar    Drug/Infectious Status (If Applicable):  No results found for: HIV, HEPCAB    COVID-19 Screening (If Applicable):   Lab Results   Component Value Date    COVID19 Not Detected 04/02/2021           Anesthesia Evaluation    Airway: Mallampati: II  TM distance: >3 FB   Neck ROM: full  Mouth opening: > = 3 FB Dental:          Pulmonary: breath sounds clear to auscultation  (+) sleep apnea:                             Cardiovascular:    (+) hypertension:,         Rhythm: regular                      Neuro/Psych:               GI/Hepatic/Renal:   (+) morbid obesity          Endo/Other:    (+) Diabetes, . Abdominal:   (+) obese,         Vascular:                                        Anesthesia Plan      MAC     ASA 3     (Possible GA)  Induction: intravenous. MIPS: Postoperative opioids intended and Prophylactic antiemetics administered. Anesthetic plan and risks discussed with patient. Plan discussed with CRNA.                   Samira De La Garza MD   4/9/2021

## 2021-04-09 NOTE — H&P
and has not had a previous breast biopsy. She has been getting regular screening mammograms since age 36. On recent screen revealed an irregular ectatic duct in the left breast with an indistinct margin. This prompted an ultrasound which showed an area of duct ectasia in the upper outer anterior aspect of the left breast.  Near this there was an irregularly-shaped nodule 8 x 3 x 6 mm. There were no abnormality seen in the left axilla. An ultrasound-guided biopsy was performed. Pathology revealed intraductal papilloma with usual intraductal hyperplasia. However no breast tissue lobules were seen. No atypical epithelium was seen. Given patient's family history and larger size of the lesion at 8 mm I concur with recommendation for excision.     Past Medical History  Past Medical History        Past Medical History:   Diagnosis Date    Diabetes mellitus ([address]       type 2    High blood pressure      Sleep apnea            Past Surgical History  Past Surgical History         Past Surgical History:   Procedure Laterality Date     SECTION        KNEE SURGERY        OIO    [facility] US GUID NDL BIOPSY LEFT Left 2021     [facility] US GUID NDL BIOPSY LEFT 3/4/2021 [facility]    SINUS SURGERY              Medications  Current Facility-Administered Medications          Current Outpatient Medications   Medication Sig Dispense Refill    dilTIAZem (DILTIAZEM CD) 180 MG extended release capsule Take 180 mg by mouth daily        lisinopril (PRINIVIL;ZESTRIL) 10 MG tablet Take 10 mg by mouth daily        atorvastatin (LIPITOR) 20 MG tablet Take 28 mg by mouth daily        metFORMIN (GLUCOPHAGE) 1000 MG tablet Take 1,000 mg by mouth 2 times daily (with meals).         sertraline (ZOLOFT) 50 MG tablet Take 50 mg by mouth daily.          No current facility-administered medications for this visit.          Allergies          Allergies   Allergen Reactions    Sulfa Antibiotics Swelling       Family History  Family History         Family History   Problem Relation Age of Onset    High Blood Pressure Mother      High Blood Pressure Father      Breast Cancer Maternal Aunt 61    Breast Cancer Maternal Grandmother 67    Breast Cancer Paternal Grandmother 55          SocialHistory  Social History               Socioeconomic History    Marital status:        Spouse name: Not on file    Number of children: 1    Years of education: Not on file    Highest education level: Not on file   Occupational History    Not on file   Social Needs    Financial resource strain: Not on file    Food insecurity       Worry: Not on file       Inability: Not on file    Transportation needs       Medical: Not on file       Non-medical: Not on file   Tobacco Use    Smoking status: Never Smoker    Smokeless tobacco: Never Used   Substance and Sexual Activity    Alcohol use: Yes       Comment: occasional    Drug use: Never    Sexual activity: Yes       Partners: Male   Lifestyle    Physical activity       Days per week: Not on file       Minutes per session: Not on file    Stress: Not on file   Relationships    Social connections       Talks on phone: Not on file       Gets together: Not on file       Attends Church service: Not on file       Active member of club or organization: Not on file       Attends meetings of clubs or organizations: Not on file       Relationship status: Not on file    Intimate partner violence       Fear of current or ex partner: Not on file       Emotionally abused: Not on file       Physically abused: Not on file       Forced sexual activity: Not on file   Other Topics Concern    Not on file   Social History Narrative    Not on file              Review of Systems  Review of Systems   Constitutional: Negative for chills, fatigue, fever and unexpected weight change. Weight gain   HENT: Negative for sore throat. Eyes: Negative for visual disturbance.    Respiratory: discharge, skin change or tenderness. Abdominal:      General: There is no distension. Palpations: There is no mass. Tenderness: There is no abdominal tenderness. Musculoskeletal:         General: No deformity. Lymphadenopathy:      Cervical: No cervical adenopathy. Right cervical: No superficial, deep or posterior cervical adenopathy. Left cervical: No superficial, deep or posterior cervical adenopathy. Upper Body:      Right upper body: No supraclavicular, axillary or pectoral adenopathy. Left upper body: No axillary or pectoral adenopathy. Skin:     General: Skin is warm and dry. Coloration: Skin is not jaundiced or pale. Findings: No bruising or rash. Neurological:      General: No focal deficit present. Mental Status: She is alert and oriented to person, place, and time. Cranial Nerves: No cranial nerve deficit. Psychiatric:         Mood and Affect: Mood normal.         Behavior: Behavior normal.         Thought Content:  Thought content normal.            No results found for: WBC, HGB, HCT, PLT, CHOL, TRIG, HDL, LDLDIRECT, ALT, AST, NA, K, CL, CREATININE, BUN, CO2, TSH, PSA, INR, GLUF, LABA1C, LABMICR     Performed by: [address] [place] [address]. Pathology     Geoff Pond                  [id number]   Assoc.                                              Page 1 of 1   [address]   [place], [place] [address]                                                       PROC: 2021   [facility]                                    RECV: 2021   [facility]                                    RPTD: 2021   [place], [place] [address]                       MRN:  [id number]    LOC: [facility]                       ACCT: [id number]  SEX: F                       : 1975  AGE: 45 Y                          PATHOLOGY REPORT                       ATTN: ISHA FIELD                       REQ: Opal Bearden       Copies To:   KALYANI CONNOR; Tracy Pang Clinical Information: LEFT BREAST MASS     FINAL DIAGNOSIS:   Left breast mass, upper outer quadrant, biopsy:    Intraductal papilloma with usual intraductal hyperplasia. Specimen:   CORE NEEDLE BREAST BIOPSY, LEFT, UOQ, MASS, TRIBELL CLIP       Gross Examination:   The container is labeled Bishnu Balloon, left upper outer quadrant,   tribell.  Received in formalin are several cylindrical and fragmented   bits of yellow-white tissue aggregating to 0.9 x 0.8 x 0.1 cm.   1 ns. EKM/DKR:[id number]     Fixative:  10% neutral buffered formalin   Tissue removal time:  13:22   Tissue fixation time:  13:23   Total fixation time: 6 hours and 37 minutes     Microscopic Examination:   The core fragment demonstrates an intraductal papilloma with usual   intraductal hyperplasia.  Fibrovascular cores are well-defined.  The   epithelium is not atypical.  No breast lobules are present in the core   biopsy.  Please clinically correlate with mammographic findings. [id number]                                                       <Sign Out Dr. Vitaly Bernardo M.D., F.C.A.P. NVML/ [address] [address]  Printed on:  3/5/2021   Delia Maurer [facility]   Luis Butler, [email or web address] [facility]   Original print date: 03/05/2021                 IMPRESSION: Mammogram BI-RADS: Category 0: Incomplete: Needs    Additional Imaging Evaluation       The irregular ectatic duct in the left breast appears    indeterminate.  An ultrasound is recommended.             damonica Inmoopaolo calculations report this patient's 10-year risk for    developing breast cancer at 6.1% and lifetime risk at 30.4%. Based on this assessment tool, this patient's calculated lifetime   Izzy Arroyo is at or above 20%  and they may be a candidate for Breast    MRI screening per the [address]    The patient has been or will be contacted.         #[id number] - ARNAV JEN DIGITAL SCREEN SELF REFERRAL W OR WO CAD    BILATERAL BILATERAL DIGITAL SCREENING MAMMOGRAM 3D/2D WITH CAD: 3/1/2021   CLINICAL: Tomosynthesis.  Self referred screening mammogram.         Comparison is made to exams dated:  2/24/2020 mammogram and    1/28/2019 mammogram - [facility].     There are scattered fibroglandular elements in both breasts that    could obscure a lesion on mammography.     Current study was also evaluated with a Computer Aided Detection    (CAD) system.     There is an irregular ectatic duct with an indistinct margin in    the left breast upper outer aspect anterior depth.  This is more    prominent.     No other significant masses, calcifications, or other findings    are seen in either breast.                 Dr. Miguel Reynolds             nn/:3/1/2021 12:34:03     copy to: Talia Ledesma M.D., [place] [address],    [address], ph: [phone], fax: [phone]       Imaging Technologist: Marsh Dakins, [address] [address]   letter sent: Additional Tests Needed     [id number]              IMPRESSION: Ultrasound BI-RADS: Category 4B: Suspicious    Abnormality: Moderate suspicion for malignancy   The irregular nodule with adjacent duct ectasia in the left    breast appear to have an intermediate suspicion for malignancy.     An ultrasound guided biopsy is recommended.     The results were reviewed with the patient.     The patient has been or will be contacted.         #[id number] - US BREAST LIMITED LEFT   ULTRASOUND OF LEFT BREAST AND LEFT AXILLA: 3/3/2021   CLINICAL: Rt breast density.         Comparison is made to exam dated:  3/1/2021 mammogram - [address]     Real-time ultrasound of the left breast and axilla was performed    on the areas of interest.  Gray scale images of the real-time    examination were reviewed.     There is an area of duct ectasia in the left breast upper outer    aspect anterior depth.  These duct ectasia display posterior    acoustic enhancement.  These correlate with mammography findings.     Near this, there is an irregularly shaped nodule measuring 8 x    3 x 6 mm.       No abnormalities were seen sonographically in the left axilla.         Dr. Rhoda Gonzalez     nn/:3/3/2021 11:47:15     copy to: Sabino Deluna M.D., [place] [address],    [address], ph: [phone], fax: [phone]       Imaging Technologist: Subha Rhoades RT(R)(M), [address] [place] [address]   letter sent: Additional Tests Needed        [id number]

## 2021-04-10 NOTE — OP NOTE
[address] [place] [address]                                OPERATIVE REPORT    PATIENT NAME: Sonia Duong                    :        1975  MED REC NO:   [id number]                           ROOM:  ACCOUNT NO:   [de-identified]                           ADMIT DATE: 2021  PROVIDER:     Penelope Del Rio M.D.    DATE OF PROCEDURE:  2021    SURGEON:  Penelope Del Rio M.D. PREOPERATIVE DIAGNOSIS:  Left breast intraductal papilloma, left breast  mass, discordant imaging findings. POSTOPERATIVE DIAGNOSIS:  Left breast intraductal papilloma, left breast  mass, discordant imaging findings. PROCEDURE:  Needle localization lumpectomy of the left breast.    ANESTHESIA:  IV local sedation, monitored anesthesia care. ESTIMATED BLOOD LOSS:  Less than 10 mL. SPECIMEN:  Radiograph revealed mass, clip, and wire within the specimen. PROCEDURE IN DETAIL:  The patient was brought to the operating suite and  placed supine on the operating table after needle localization procedure  was performed in the [facility]. Procedure was performed  in the surgery center. She was given _____ gm Ancef intravenously. After appropriate sedation was given, time-out was performed. Left  breast and wire were prepped and draped in the usual sterile fashion. A  periareolar incision was made at the upper outer quadrant of the left  breast.  Dissection was carried down to the wire, and lumpectomy  surrounding the wire was performed sharply with Metzenbaum scissors. Hemostasis of the lumpectomy cavity was achieved with careful  electrocautery. Specimen was oriented with MarginMap. It was sent for  specimen radiograph containing mass, clip, and wire per my review. Dermis was closed with interrupted 3-0 Vicryl, suture and the skin was  closed with running subcuticular 4-0 Monocryl suture. Skin glue was  applied.   The patient was transported back to the phase II of the  recovery area in the surgery center. Helena Spencer M.D.    D: 04/09/2021 14:46:32       T: 04/09/2021 14:56:21     [id number]  Job#: [id number]     Doc#: [id number]    CC:  Kristal Pacheco.  Jane Goodrich M.D.

## 2021-04-12 ENCOUNTER — TELEPHONE (OUTPATIENT)
Dept: SURGERY | Age: 46
End: 2021-04-12

## 2021-04-12 NOTE — TELEPHONE ENCOUNTER
----- Message from Charla Clark MD sent at 4/12/2021  4:56 PM EDT -----  Benign may notify pt  ----- Message -----  From: Megan White Incoming Lab Results From Soft  Sent: 4/12/2021  12:42 PM EDT  To: Charla Clark MD

## 2021-04-12 NOTE — TELEPHONE ENCOUNTER
S/p 4/9 Needle localization lumpectomy of the left breast.    Attempted to contact pt post op- no answer- left appropriate VM with return phone number.

## 2021-04-21 NOTE — PROGRESS NOTES
Chay Duque MD  General Surgery  Postprocedure Evaluation in Office  Pt Name: Juan Ferguson  Date of Birth 1975   Today's Date: 4/22/2021  Medical Record Number: [id number]  Primary Care Provider: Lilly Avendaño  Chief Complaint   Patient presents with    Post-Op Check     s/p 4/9 Needle localization lumpectomy of the left breast.     ASSESSMENT      1. Intraductal papilloma of left breast    2. Postop check         PLANS       1. Pathology reviewed with the patient who understands. All questions were answered. Benign intraductal papilloma. No associated atypia. Biopsy site changes noted  2. Follow up: Return in about 6 months (around 10/22/2021). 3.  Call in interim with any questions or concerns. Ronak Romero is seen today for post-op follow-up. She is status post left partial mastectomy for left breast mass intraductal papilloma with discordant imaging findings. Left breast periareolar incision is healing well. No evidence of hematoma or seroma. Pathology discussed with patient. Benign. All questions answered. Medications    Current Outpatient Medications:     dilTIAZem (DILTIAZEM CD) 180 MG extended release capsule, Take 180 mg by mouth daily, Disp: , Rfl:     lisinopril (PRINIVIL;ZESTRIL) 10 MG tablet, Take 10 mg by mouth daily, Disp: , Rfl:     atorvastatin (LIPITOR) 20 MG tablet, Take 28 mg by mouth daily, Disp: , Rfl:     metFORMIN (GLUCOPHAGE) 1000 MG tablet, Take 1,000 mg by mouth 2 times daily (with meals). , Disp: , Rfl:     sertraline (ZOLOFT) 50 MG tablet, Take 50 mg by mouth daily. , Disp: , Rfl:   Allergies    Allergies   Allergen Reactions    Sulfa Antibiotics Swelling       Review of Systems  History obtained from the patient. Constitutional: Denies any fever, chills, fatigue. Wound: Denies any rash, skin color changes or wound problems. Resp: Denies any cough, shortness of breath. CV: Denies any chest pain, orthopnea or syncope.    GI: Denies any nausea, vomiting, blood in the stool, constipation or diarrhea. OBJECTIVE     VITALS: /72 (Site: Right Lower Arm, Position: Sitting, Cuff Size: Medium Adult)   Pulse 63   Temp 97.1 °F (36.2 °C) (Temporal)   Resp 20   Wt (!) 347 lb (157.4 kg)   LMP 2021   SpO2 98%   BMI 51.24 kg/m²     CONSTITUTIONAL: Alert and oriented times 3, no acute distress and cooperative to examination. SKIN: Skin color, texture, turgor normal. No rashes or lesions. INCISION: wound margins intact and healing well. No signs of infection. No drainage. NECK: Soft, trachea midline and straight  BREAST: Lumpectomy left No evidence of seroma or hematoma. LUNGS: Clear   CARDIOVASCULAR: Normal heart rate  NEUROLOGIC: No sensory or motor nerve irritation  EXTREMITIES: no cyanosis, no clubbing and no edema. Performed by: Santosh Rosario. Pathology     Selden Ranks                  [id number]   Assoc.                                              Page 1 of 0 [id number]   [place] [address] [address], [place] [address]                                                       PROC: 2021   [facility]                                    RECV: 04/10/2021   [address]                                    RPTD: 2021   [place] [address], [place] [address]                       MRN:  [id number]    LOC: [facility]                       ACCT: [de-identified]  SEX: F                       : 1975  AGE: 39 Y                          PATHOLOGY REPORT                       ATTN: CONNIE RIDDLE   [de-identified]                  REQ: Amy RIDDLE       Copies To:   JOHN MORGAN       Clinical Information: LEFT BREAST PAPILLOMA     FINAL DIAGNOSIS:   Breast, left, lumpectomy:    Intraductal papilloma with usual ductal hyperplasia and apocrine   metaplasia.     Fibrocystic changes including microcyst formation.    Changes consistent with previous biopsy site.    Negative for malignancy.      Specimen:   EXCISION OF BREAST, LEFT LUMP       Gross Examination:   The container is

## 2021-04-22 ENCOUNTER — OFFICE VISIT (OUTPATIENT)
Dept: SURGERY | Age: 46
End: 2021-04-22

## 2021-04-22 VITALS
HEART RATE: 63 BPM | SYSTOLIC BLOOD PRESSURE: 112 MMHG | BODY MASS INDEX: 51.24 KG/M2 | WEIGHT: 293 LBS | TEMPERATURE: 97.1 F | DIASTOLIC BLOOD PRESSURE: 72 MMHG | RESPIRATION RATE: 20 BRPM | OXYGEN SATURATION: 98 %

## 2021-04-22 DIAGNOSIS — D24.2 INTRADUCTAL PAPILLOMA OF LEFT BREAST: Primary | ICD-10-CM

## 2021-04-22 DIAGNOSIS — Z09 POSTOP CHECK: ICD-10-CM

## 2021-04-22 PROCEDURE — 99024 POSTOP FOLLOW-UP VISIT: CPT | Performed by: SURGERY

## 2021-04-22 NOTE — LETTER
[address] [facility]  [place] [address]  Phone: [phone]  Fax: [phone]    Brittny Bella MD        April 22, 2021    Amol Keller  [address] [facility] [place]  [address]    Patient: Khoi Hernadez   MR Number: [id number]   YOB: 1975   Date of Visit: 4/22/2021     Dear Amol Keller,    I recently saw your patient, Khoi Hernadez for a follow-up visit. Below are the relevant portions of my assessment and plan of care. 1. Intraductal papilloma of left breast    2. Postop check        1. Pathology reviewed with the patient who understands. All questions were answered. Benign intraductal papilloma. No associated atypia. Biopsy site changes noted  2. Follow up: Return in about 6 months (around 10/22/2021). 3.  Call in interim with any questions or concerns. If you have questions, please do not hesitate to call me. I look forward to following Jossie along with you.     Sincerely,          Brittny Bella MD

## 2021-05-18 ENCOUNTER — APPOINTMENT (OUTPATIENT)
Dept: CT IMAGING | Age: 46
End: 2021-05-18
Payer: COMMERCIAL

## 2021-05-18 ENCOUNTER — APPOINTMENT (OUTPATIENT)
Dept: GENERAL RADIOLOGY | Age: 46
End: 2021-05-18
Payer: COMMERCIAL

## 2021-05-18 ENCOUNTER — HOSPITAL ENCOUNTER (EMERGENCY)
Age: 46
Discharge: HOME | End: 2021-05-18
Attending: EMERGENCY MEDICINE
Payer: COMMERCIAL

## 2021-05-18 VITALS
TEMPERATURE: 98.9 F | DIASTOLIC BLOOD PRESSURE: 101 MMHG | OXYGEN SATURATION: 98 % | RESPIRATION RATE: 16 BRPM | HEART RATE: 66 BPM | SYSTOLIC BLOOD PRESSURE: 174 MMHG

## 2021-05-18 DIAGNOSIS — K59.00 CONSTIPATION, UNSPECIFIED CONSTIPATION TYPE: Primary | ICD-10-CM

## 2021-05-18 LAB
ALBUMIN SERPL-MCNC: 3.9 G/DL (ref 3.5–5.1)
ALP BLD-CCNC: 136 U/L (ref 38–126)
ALT SERPL-CCNC: 19 U/L (ref 11–66)
ANION GAP SERPL CALCULATED.3IONS-SCNC: 14 MEQ/L (ref 8–16)
AST SERPL-CCNC: 17 U/L (ref 5–40)
BASOPHILS # BLD: 0.4 %
BASOPHILS ABSOLUTE: 0.1 THOU/MM3 (ref 0–0.1)
BILIRUB SERPL-MCNC: 0.5 MG/DL (ref 0.3–1.2)
BILIRUBIN URINE: NEGATIVE
BLOOD, URINE: NEGATIVE
BUN BLDV-MCNC: 10 MG/DL (ref 7–22)
CALCIUM SERPL-MCNC: 9.5 MG/DL (ref 8.5–10.5)
CHARACTER, URINE: CLEAR
CHLORIDE BLD-SCNC: 105 MEQ/L (ref 98–111)
CO2: 19 MEQ/L (ref 23–33)
COLOR: YELLOW
CREAT SERPL-MCNC: 0.5 MG/DL (ref 0.4–1.2)
EOSINOPHIL # BLD: 11.7 %
EOSINOPHILS ABSOLUTE: 1.6 THOU/MM3 (ref 0–0.4)
ERYTHROCYTE [DISTWIDTH] IN BLOOD BY AUTOMATED COUNT: 13.4 % (ref 11.5–14.5)
ERYTHROCYTE [DISTWIDTH] IN BLOOD BY AUTOMATED COUNT: 43.8 FL (ref 35–45)
GFR SERPL CREATININE-BSD FRML MDRD: > 90 ML/MIN/1.73M2
GLUCOSE BLD-MCNC: 206 MG/DL (ref 70–108)
GLUCOSE URINE: 250 MG/DL
HCT VFR BLD CALC: 44.5 % (ref 37–47)
HEMOGLOBIN: 13.6 GM/DL (ref 12–16)
IMMATURE GRANS (ABS): 0.07 THOU/MM3 (ref 0–0.07)
IMMATURE GRANULOCYTES: 0.5 %
KETONES, URINE: NEGATIVE
LEUKOCYTE ESTERASE, URINE: NEGATIVE
LIPASE: 33.1 U/L (ref 5.6–51.3)
LYMPHOCYTES # BLD: 12.9 %
LYMPHOCYTES ABSOLUTE: 1.8 THOU/MM3 (ref 1–4.8)
MCH RBC QN AUTO: 27.7 PG (ref 26–33)
MCHC RBC AUTO-ENTMCNC: 30.6 GM/DL (ref 32.2–35.5)
MCV RBC AUTO: 90.6 FL (ref 81–99)
MONOCYTES # BLD: 5.3 %
MONOCYTES ABSOLUTE: 0.7 THOU/MM3 (ref 0.4–1.3)
NITRITE, URINE: NEGATIVE
NUCLEATED RED BLOOD CELLS: 0 /100 WBC
OSMOLALITY CALCULATION: 280.7 MOSMOL/KG (ref 275–300)
PH UA: 6 (ref 5–9)
PLATELET # BLD: 359 THOU/MM3 (ref 130–400)
PMV BLD AUTO: 9.2 FL (ref 9.4–12.4)
POTASSIUM SERPL-SCNC: 4.4 MEQ/L (ref 3.5–5.2)
PREGNANCY, SERUM: NEGATIVE
PROTEIN UA: NEGATIVE
RBC # BLD: 4.91 MILL/MM3 (ref 4.2–5.4)
SEG NEUTROPHILS: 69.2 %
SEGMENTED NEUTROPHILS ABSOLUTE COUNT: 9.5 THOU/MM3 (ref 1.8–7.7)
SODIUM BLD-SCNC: 138 MEQ/L (ref 135–145)
SPECIFIC GRAVITY, URINE: 1.02 (ref 1–1.03)
TOTAL PROTEIN: 8 G/DL (ref 6.1–8)
UROBILINOGEN, URINE: 0.2 EU/DL (ref 0–1)
WBC # BLD: 13.8 THOU/MM3 (ref 4.8–10.8)

## 2021-05-18 PROCEDURE — 84703 CHORIONIC GONADOTROPIN ASSAY: CPT

## 2021-05-18 PROCEDURE — 85025 COMPLETE CBC W/AUTO DIFF WBC: CPT

## 2021-05-18 PROCEDURE — 81003 URINALYSIS AUTO W/O SCOPE: CPT

## 2021-05-18 PROCEDURE — 74018 RADEX ABDOMEN 1 VIEW: CPT

## 2021-05-18 PROCEDURE — 80053 COMPREHEN METABOLIC PANEL: CPT

## 2021-05-18 PROCEDURE — 6370000000 HC RX 637 (ALT 250 FOR IP): Performed by: EMERGENCY MEDICINE

## 2021-05-18 PROCEDURE — 36415 COLL VENOUS BLD VENIPUNCTURE: CPT

## 2021-05-18 PROCEDURE — 99285 EMERGENCY DEPT VISIT HI MDM: CPT

## 2021-05-18 PROCEDURE — 83690 ASSAY OF LIPASE: CPT

## 2021-05-18 PROCEDURE — 74176 CT ABD & PELVIS W/O CONTRAST: CPT

## 2021-05-18 RX ADMIN — MAGNESIUM CITRATE 296 ML: 1.75 LIQUID ORAL at 22:05

## 2021-05-18 ASSESSMENT — ENCOUNTER SYMPTOMS
PHOTOPHOBIA: 0
EYE DISCHARGE: 0
COUGH: 0
EYE ITCHING: 0
VOMITING: 0
ABDOMINAL DISTENTION: 0
EYE PAIN: 0
BACK PAIN: 0
DIARRHEA: 0
CONSTIPATION: 1
EYE REDNESS: 0
NAUSEA: 1
CHEST TIGHTNESS: 0
ABDOMINAL PAIN: 1
SHORTNESS OF BREATH: 0
SORE THROAT: 0
RHINORRHEA: 0
STRIDOR: 0
WHEEZING: 0

## 2021-05-18 ASSESSMENT — PAIN SCALES - GENERAL: PAINLEVEL_OUTOF10: 5

## 2021-05-18 ASSESSMENT — PAIN DESCRIPTION - LOCATION: LOCATION: ABDOMEN

## 2021-05-18 ASSESSMENT — PAIN DESCRIPTION - PAIN TYPE: TYPE: ACUTE PAIN

## 2021-05-18 NOTE — ED PROVIDER NOTES
[facility]  EMERGENCY DEPARTMENT ENCOUNTER      PATIENT NAME: Al Alvarez  MRN: [id number]  : 1975  CINTRON: 2021  PROVIDER: Aldair Santana MD      CHIEF COMPLAINT       Chief Complaint   Patient presents with    Constipation       Patient is seen and evaluated in a timely fashion. Nurses Notes are reviewed and I agree except as noted in the HPI. HISTORY OF PRESENT ILLNESS    Jossie Rangel is a 39 y.o. female who presents to Emergency Department with Constipation    Onset: Gradual  Location: Abdomen  Quality: No BM, abdominal pain and nausea  Radiation: None  Severity: Mild  Timing: Last four days  Modifying factors: None  Duration: Persistent  Context: Past medical history remarkable for obesity, hypertension, MONTY, and type II DM. Patient is on weight watcher. Patient has chronic constipation. No good bowel movements for last 4 days. No active vomiting. Patient states she feels she needs to go all the time, but she only has small amount of liquid out. This HPI was provided by patient. REVIEW OF SYSTEMS   Review of Systems   Constitutional: Negative for activity change, appetite change, chills, fatigue, fever and unexpected weight change. HENT: Negative for congestion, ear discharge, ear pain, hearing loss, nosebleeds, rhinorrhea and sore throat. Eyes: Negative for photophobia, pain, discharge, redness and itching. Respiratory: Negative for cough, chest tightness, shortness of breath, wheezing and stridor. Cardiovascular: Negative for chest pain, palpitations and leg swelling. Gastrointestinal: Positive for abdominal pain, constipation and nausea. Negative for abdominal distention, diarrhea and vomiting. Endocrine: Negative for cold intolerance, heat intolerance, polydipsia and polyphagia. Genitourinary: Negative for dysuria, flank pain, frequency and hematuria.    Musculoskeletal: Negative for arthralgias, back pain, gait problem, myalgias, neck pain and neck stiffness. Skin: Negative for pallor, rash and wound. Allergic/Immunologic: Negative for environmental allergies and food allergies. Neurological: Negative for dizziness, tremors, syncope, weakness and headaches. Psychiatric/Behavioral: Negative for agitation, behavioral problems, confusion, self-injury, sleep disturbance and suicidal ideas. PAST MEDICAL HISTORY     Past Medical History:   Diagnosis Date    Diabetes mellitus ([address]     type 2    High blood pressure     Sleep apnea     wears cpap       SURGICAL HISTORY       Past Surgical History:   Procedure Laterality Date    BREAST LUMPECTOMY Left 4/9/2021    LEFT BREAST LUMPECTOMY, PRE-OP NEEDLE LOC performed by Penelope Del Rio MD at [facility]  2018    [facility] US GUID NDL BIOPSY LEFT Left 03/04/2021    ARNA[address] LEFT 3/4/2021 [address] GUIDED NEEDLE LOC OF LEFT BREAST Left 4/9/2021    US GUIDED NEEDLE LOC OF LEFT BREAST 4/9/2021 [facility]       CURRENT MEDICATIONS       Discharge Medication List as of 5/18/2021  9:57 PM      CONTINUE these medications which have NOT CHANGED    Details   dilTIAZem (DILTIAZEM CD) 180 MG extended release capsule Take 180 mg by mouth dailyHistorical Med      lisinopril (PRINIVIL;ZESTRIL) 10 MG tablet Take 10 mg by mouth dailyHistorical Med      atorvastatin (LIPITOR) 20 MG tablet Take 28 mg by mouth dailyHistorical Med      metFORMIN (GLUCOPHAGE) 1000 MG tablet Take 1,000 mg by mouth 2 times daily (with meals). sertraline (ZOLOFT) 50 MG tablet Take 50 mg by mouth daily. ALLERGIES     Sulfa antibiotics    FAMILY HISTORY     She indicated that her mother is alive. She indicated that her father is alive. She indicated that the status of her maternal grandmother is unknown. She indicated that the status of her paternal grandmother is unknown.  She indicated that the status of her maternal aunt is unknown.   family history includes Breast Cancer (age of onset: 55) in her paternal grandmother; Breast Cancer (age of onset: 61) in her maternal aunt; Breast Cancer (age of onset: 67) in her maternal grandmother; High Blood Pressure in her father and mother. SOCIAL HISTORY      reports that she has never smoked. She has never used smokeless tobacco. She reports current alcohol use. She reports that she does not use drugs. PHYSICAL EXAM      oral temperature is 98.9 °F (37.2 °C). Her blood pressure is 174/101 (abnormal) and her pulse is 66. Her respiration is 16 and oxygen saturation is 98%. Physical Exam  Vitals and nursing note reviewed. Constitutional:       Appearance: She is well-developed. She is not diaphoretic. HENT:      Head: Normocephalic and atraumatic. Nose: Nose normal.   Eyes:      General: No scleral icterus. Right eye: No discharge. Left eye: No discharge. Conjunctiva/sclera: Conjunctivae normal.      Pupils: Pupils are equal, round, and reactive to light. Neck:      Vascular: No JVD. Trachea: No tracheal deviation. Cardiovascular:      Rate and Rhythm: Normal rate and regular rhythm. Heart sounds: Normal heart sounds. No murmur heard. No friction rub. No gallop. Pulmonary:      Effort: Pulmonary effort is normal. No respiratory distress. Breath sounds: Normal breath sounds. No stridor. No wheezing or rales. Chest:      Chest wall: No tenderness. Abdominal:      General: Bowel sounds are normal. There is no distension. Palpations: Abdomen is soft. There is no mass. Tenderness: There is abdominal tenderness. There is no guarding or rebound. Hernia: No hernia is present. Comments: Lower abdominal tenderness   Musculoskeletal:         General: No tenderness or deformity. Cervical back: Normal range of motion and neck supple. Lymphadenopathy:      Cervical: No cervical adenopathy.    Skin: 38 - 126 U/L    Total Protein 8.0 6.1 - 8.0 g/dL    Albumin 3.9 3.5 - 5.1 g/dL    Total Bilirubin 0.5 0.3 - 1.2 mg/dL    ALT 19 11 - 66 U/L   Lipase   Result Value Ref Range    Lipase 33.1 5.6 - 51.3 U/L   Urine reflex to culture    Specimen: Urine, clean catch   Result Value Ref Range    Glucose, Ur 250 (A) NEGATIVE mg/dl    Bilirubin Urine NEGATIVE NEGATIVE    Ketones, Urine NEGATIVE NEGATIVE    Specific Gravity, Urine 1.018 1.002 - 1.030    Blood, Urine NEGATIVE NEGATIVE    pH, UA 6.0 5.0 - 9.0    Protein, UA NEGATIVE NEGATIVE    Urobilinogen, Urine 0.2 0.0 - 1.0 eu/dl    Nitrite, Urine NEGATIVE NEGATIVE    Leukocyte Esterase, Urine NEGATIVE NEGATIVE    Color, UA YELLOW STRAW-YELLOW    Character, Urine CLEAR CLEAR-SL CLOUD   HCG Qualitative, Serum   Result Value Ref Range    Preg, Serum NEGATIVE NEGATIVE   Anion Gap   Result Value Ref Range    Anion Gap 14.0 8.0 - 16.0 meq/L   Glomerular Filtration Rate, Estimated   Result Value Ref Range    Est, Glom Filt Rate >90 ml/min/1.73m2   Osmolality   Result Value Ref Range    Osmolality Calc 280.7 275.0 - 300.0 mOsmol/kg       RADIOLOGY REPORTS  CT ABDOMEN PELVIS WO CONTRAST Additional Contrast? None   Final Result   No urinary obstruction or calculi. Appendix is normal.  No other acute    findings. This document has been electronically signed by: Yann Sweeney MD on    05/18/2021 09:21 PM      All CTs at this facility use dose modulation techniques and iterative    reconstructions, and/or weight-based dosing   when appropriate to reduce radiation to a low as reasonably achievable. XR ABDOMEN (KUB) (SINGLE AP VIEW)   Final Result   Impression:   Mild to moderate stool in the colon. This document has been electronically signed by: Toni Borrero MD on    05/18/2021 07:26 PM          MEDICAL [facility] RATIONALES     Differential diagnsis: Constipation, ileus, UTI, SBO    Actions: labs, KUB.      ED Vitals:  Vitals:    05/18/21 1818 05/18/21 1946

## 2021-05-18 NOTE — ED NOTES
Pt arrives to ED c/o constipation and abdominal pain. pt states it has been 4 days since her last bowel movement. Pt states she has a small amount of liquid stool but nothing formed. Pt states she feels the urge to go and feels very bloated but is unable. Pt states she took a laxative on Sunday with no relief. Pt rates pain 5/10. Pt shows no signs of distress.       Bere JUNIOR, BRE  05/18/21 4801

## 2021-05-18 NOTE — ED NOTES
Pt updated on POC. Pt resting on cot with unlabored respirations.       Gee PORTER RN  05/18/21 1950

## 2021-05-19 NOTE — ED NOTES
Pt updated on POC. Pt respirations unlabored. Pt given ice water at this time.       Core Party BRE CHOW  05/18/21 2104

## 2021-10-05 ENCOUNTER — NURSE ONLY (OUTPATIENT)
Dept: LAB | Age: 46
End: 2021-10-05

## 2021-10-15 LAB — CYTOLOGY THIN PREP PAP: NORMAL

## 2022-04-25 ENCOUNTER — HOSPITAL ENCOUNTER (OUTPATIENT)
Dept: MAMMOGRAPHY | Age: 47
Discharge: HOME | End: 2022-04-25
Payer: COMMERCIAL

## 2022-04-25 DIAGNOSIS — Z12.31 VISIT FOR SCREENING MAMMOGRAM: ICD-10-CM

## 2022-04-25 PROCEDURE — 77063 BREAST TOMOSYNTHESIS BI: CPT

## 2023-07-13 ENCOUNTER — HOSPITAL ENCOUNTER (OUTPATIENT)
Dept: MAMMOGRAPHY | Age: 48
Discharge: HOME | End: 2023-07-13
Payer: COMMERCIAL

## 2023-07-13 DIAGNOSIS — Z12.31 VISIT FOR SCREENING MAMMOGRAM: ICD-10-CM

## 2023-07-13 PROCEDURE — 77063 BREAST TOMOSYNTHESIS BI: CPT

## 2023-11-24 ENCOUNTER — HOSPITAL ENCOUNTER (EMERGENCY)
Age: 48
Discharge: HOME | End: 2023-11-24
Payer: COMMERCIAL

## 2023-11-24 VITALS
OXYGEN SATURATION: 100 % | HEART RATE: 77 BPM | TEMPERATURE: 97 F | SYSTOLIC BLOOD PRESSURE: 142 MMHG | RESPIRATION RATE: 16 BRPM | DIASTOLIC BLOOD PRESSURE: 69 MMHG

## 2023-11-24 DIAGNOSIS — N30.00 ACUTE CYSTITIS WITHOUT HEMATURIA: Primary | ICD-10-CM

## 2023-11-24 LAB
BILIRUB UR STRIP.AUTO-MCNC: NEGATIVE MG/DL
CHARACTER UR: CLEAR
COLOR: YELLOW
GLUCOSE UR QL STRIP.AUTO: NEGATIVE MG/DL
KETONES UR QL STRIP.AUTO: NEGATIVE
NITRITE UR QL STRIP.AUTO: NEGATIVE
PH UR STRIP.AUTO: 5.5 [PH] (ref 5–9)
PROT UR STRIP.AUTO-MCNC: NEGATIVE MG/DL
RBC #/AREA URNS HPF: NEGATIVE /[HPF]
SP GR UR STRIP.AUTO: 1.01 (ref 1–1.03)
UROBILINOGEN, URINE: 0.2 EU/DL (ref 0.2–1)
WBC #/AREA URNS HPF: ABNORMAL /[HPF]

## 2023-11-24 PROCEDURE — 81003 URINALYSIS AUTO W/O SCOPE: CPT

## 2023-11-24 PROCEDURE — 87086 URINE CULTURE/COLONY COUNT: CPT

## 2023-11-24 PROCEDURE — 99213 OFFICE O/P EST LOW 20 MIN: CPT

## 2023-11-24 PROCEDURE — 99203 OFFICE O/P NEW LOW 30 MIN: CPT | Performed by: NURSE PRACTITIONER

## 2023-11-24 RX ORDER — TEMAZEPAM 15 MG/1
CAPSULE ORAL
COMMUNITY
Start: 2023-11-07

## 2023-11-24 RX ORDER — LISINOPRIL 20 MG/1
TABLET ORAL
COMMUNITY
Start: 2023-11-02 | End: 2023-11-24

## 2023-11-24 RX ORDER — SPIRONOLACTONE 50 MG/1
50 TABLET, FILM COATED ORAL DAILY
COMMUNITY
Start: 2023-11-16

## 2023-11-24 RX ORDER — ATORVASTATIN CALCIUM 10 MG/1
TABLET, FILM COATED ORAL
COMMUNITY
Start: 2023-11-12 | End: 2023-11-24

## 2023-11-24 RX ORDER — CEFDINIR 300 MG/1
300 CAPSULE ORAL 2 TIMES DAILY
Qty: 6 CAPSULE | Refills: 0 | Status: SHIPPED | OUTPATIENT
Start: 2023-11-24 | End: 2023-11-27

## 2023-11-24 ASSESSMENT — ENCOUNTER SYMPTOMS
SHORTNESS OF BREATH: 0
COUGH: 0
CHOKING: 0
WHEEZING: 0
STRIDOR: 0
ABDOMINAL PAIN: 1
APNEA: 0
NAUSEA: 0
VOMITING: 0
CHEST TIGHTNESS: 0

## 2023-11-24 ASSESSMENT — PAIN SCALES - GENERAL: PAINLEVEL_OUTOF10: 2

## 2023-11-26 LAB
BACTERIA UR CULT: ABNORMAL
ORGANISM: ABNORMAL

## 2024-07-02 ENCOUNTER — TELEPHONE (OUTPATIENT)
Dept: FAMILY MEDICINE CLINIC | Age: 49
End: 2024-07-02

## 2024-07-02 NOTE — TELEPHONE ENCOUNTER
Pt would like to become a New Pt of Dr. Tyson to go over more detailed blood work, currently sees Matt Rivero as PCP and not looking to change. Pt was informed that Dr. Tyson is not currently accepting new patients, but she would like to be put on a wait list if available. Please advise.

## 2024-07-08 NOTE — TELEPHONE ENCOUNTER
Called pt.  We discussed, not taking new pt appt until beginning of March 2025.  Asked her to call back then.  She voiced understanding.

## 2024-08-20 PROBLEM — R06.02 SHORTNESS OF BREATH: Status: ACTIVE | Noted: 2024-08-20

## 2024-08-20 PROBLEM — E11.9 DIABETES MELLITUS (HCC): Status: ACTIVE | Noted: 2024-08-20

## 2024-08-20 PROBLEM — J35.1 ENLARGED TONSILS: Status: ACTIVE | Noted: 2024-08-20

## 2024-08-20 PROBLEM — I10 PRIMARY HYPERTENSION: Status: ACTIVE | Noted: 2024-08-20

## 2024-08-20 PROBLEM — E66.01 MORBID OBESITY WITH BMI OF 40.0-44.9, ADULT (HCC): Status: ACTIVE | Noted: 2024-08-20

## 2024-08-20 PROBLEM — N12 PYELONEPHRITIS: Status: ACTIVE | Noted: 2024-08-20

## 2024-08-20 PROBLEM — R40.0 DAYTIME SOMNOLENCE: Status: ACTIVE | Noted: 2024-08-20

## 2024-08-20 PROBLEM — J34.9 DISORDER OF PARANASAL SINUS: Status: ACTIVE | Noted: 2024-08-20

## 2024-08-20 PROBLEM — G47.33 OBSTRUCTIVE SLEEP APNEA SYNDROME: Status: ACTIVE | Noted: 2024-08-20

## 2024-08-20 NOTE — PROGRESS NOTES
clarity and sleep    Food Drink Symptom Log;  I asked this patient to track these items and any other symptoms on their list on a weekly basis to documenttheir progress or lack of same. This can be done on the symptom tracking sheet I gave them at today's visit but looks like this:                                                      Rate on scale of 0-10 with zero = not noticeable  Symptom:                            Week 1               2                 3                 4               Etc            Hair loss    Foot cramps    Paresthesia    Aches    IBS (irritable bowel)    Constipation    Diarrhea  Nocturia (up to bathroom at night)    Fatigue/Energy level  Stress      On the other side of the sheet they can track their food, drink, environment, activity, symptoms etc      Avoiding Latex-like proteins in my foods;    Avocados, Bananas, Celery, Figs & Kiwi proteins have latex-like proteins to inflame our immune systems, plus 47 more foods  How Can I Have A Latex Allergy?  Eating foods with latex-like protein exposes us to latex allergies.  Our body cannot tell the differencebetween these latex-like proteins and latex from rubber products since many people are allergic to fruit, vegetables and latex. Read labels on pre-packaged foods. This list to avoid is only a guide if you are known allergicto latex or have a latex rash on your chin, cheeks and lines on your neck and chest. The amount of latex is different in each food product or fruit variety.  Avoid out of Season if not grown locally:   Melon, Nectarine, Papaya, Cherry, Passion fruit, Plum, Chestnuts, and Tomato. Avocado, Banana, Celery, Figs, and Kiwi always contain Latex-like protein.    What’s in Season?  Strawberries taste better in June than December because June is strawberry season so buy locally grown produce \"in season\" for the best flavor, cost, and less Latex. Locally grown produce not only tastes great but also requires little or no ethylene

## 2024-08-21 ENCOUNTER — LAB (OUTPATIENT)
Dept: LAB | Age: 49
End: 2024-08-21

## 2024-08-21 ENCOUNTER — OFFICE VISIT (OUTPATIENT)
Dept: FAMILY MEDICINE CLINIC | Age: 49
End: 2024-08-21
Payer: COMMERCIAL

## 2024-08-21 VITALS
TEMPERATURE: 98.1 F | BODY MASS INDEX: 43.4 KG/M2 | SYSTOLIC BLOOD PRESSURE: 128 MMHG | RESPIRATION RATE: 12 BRPM | HEART RATE: 67 BPM | DIASTOLIC BLOOD PRESSURE: 82 MMHG | WEIGHT: 293 LBS | HEIGHT: 69 IN | OXYGEN SATURATION: 98 %

## 2024-08-21 DIAGNOSIS — N12 PYELONEPHRITIS: ICD-10-CM

## 2024-08-21 DIAGNOSIS — J34.9: ICD-10-CM

## 2024-08-21 DIAGNOSIS — G47.33 OBSTRUCTIVE SLEEP APNEA SYNDROME: ICD-10-CM

## 2024-08-21 DIAGNOSIS — I10 PRIMARY HYPERTENSION: ICD-10-CM

## 2024-08-21 DIAGNOSIS — R06.02 SHORTNESS OF BREATH: ICD-10-CM

## 2024-08-21 DIAGNOSIS — E66.01 MORBID OBESITY WITH BMI OF 40.0-44.9, ADULT (HCC): ICD-10-CM

## 2024-08-21 DIAGNOSIS — R40.0 DAYTIME SOMNOLENCE: ICD-10-CM

## 2024-08-21 DIAGNOSIS — J35.1 ENLARGED TONSILS: ICD-10-CM

## 2024-08-21 DIAGNOSIS — D24.2 PAPILLOMA OF LEFT BREAST: ICD-10-CM

## 2024-08-21 DIAGNOSIS — E08.00 DIABETES MELLITUS DUE TO UNDERLYING CONDITION WITH HYPEROSMOLARITY WITHOUT COMA, WITHOUT LONG-TERM CURRENT USE OF INSULIN (HCC): ICD-10-CM

## 2024-08-21 DIAGNOSIS — R06.02 SHORTNESS OF BREATH: Primary | ICD-10-CM

## 2024-08-21 LAB
25(OH)D3 SERPL-MCNC: 37 NG/ML (ref 30–100)
ALBUMIN SERPL BCG-MCNC: 4.3 G/DL (ref 3.5–5.1)
ALP SERPL-CCNC: 105 U/L (ref 38–126)
ALT SERPL W/O P-5'-P-CCNC: 28 U/L (ref 11–66)
AMYLASE SERPL-CCNC: 63 U/L (ref 20–104)
ANION GAP SERPL CALC-SCNC: 15 MEQ/L (ref 8–16)
AST SERPL-CCNC: 20 U/L (ref 5–40)
BASOPHILS ABSOLUTE: 0.1 THOU/MM3 (ref 0–0.1)
BASOPHILS NFR BLD AUTO: 0.8 %
BILIRUB CONJ SERPL-MCNC: 0.2 MG/DL (ref 0.1–13.8)
BILIRUB SERPL-MCNC: 0.6 MG/DL (ref 0.3–1.2)
BUN SERPL-MCNC: 15 MG/DL (ref 7–22)
CALCIUM SERPL-MCNC: 10.1 MG/DL (ref 8.5–10.5)
CHLORIDE SERPL-SCNC: 99 MEQ/L (ref 98–111)
CHOLEST SERPL-MCNC: 134 MG/DL (ref 100–199)
CO2 SERPL-SCNC: 24 MEQ/L (ref 23–33)
CORTIS SERPL-MCNC: 7.03 UG/DL
CORTISOL COLLECTION INFO: NORMAL
CREAT SERPL-MCNC: 0.6 MG/DL (ref 0.4–1.2)
DEPRECATED MEAN GLUCOSE BLD GHB EST-ACNC: 117 MG/DL (ref 70–126)
DEPRECATED RDW RBC AUTO: 45.3 FL (ref 35–45)
EOSINOPHIL NFR BLD AUTO: 1.9 %
EOSINOPHILS ABSOLUTE: 0.2 THOU/MM3 (ref 0–0.4)
ERYTHROCYTE [DISTWIDTH] IN BLOOD BY AUTOMATED COUNT: 13.3 % (ref 11.5–14.5)
ERYTHROCYTE [SEDIMENTATION RATE] IN BLOOD BY WESTERGREN METHOD: 33 MM/HR (ref 0–20)
FOLATE SERPL-MCNC: > 20 NG/ML (ref 4.8–24.2)
GFR SERPL CREATININE-BSD FRML MDRD: > 90 ML/MIN/1.73M2
GLUCOSE SERPL-MCNC: 104 MG/DL (ref 70–108)
HBA1C MFR BLD HPLC: 5.9 % (ref 4.4–6.4)
HCT VFR BLD AUTO: 41.1 % (ref 37–47)
HDLC SERPL-MCNC: 37 MG/DL
HGB BLD-MCNC: 12.7 GM/DL (ref 12–16)
IMM GRANULOCYTES # BLD AUTO: 0.02 THOU/MM3 (ref 0–0.07)
IMM GRANULOCYTES NFR BLD AUTO: 0.2 %
IRON SERPL-MCNC: 44 UG/DL (ref 50–170)
LDLC SERPL CALC-MCNC: 63 MG/DL
LIPASE SERPL-CCNC: 36.6 U/L (ref 5.6–51.3)
LYMPHOCYTES ABSOLUTE: 2 THOU/MM3 (ref 1–4.8)
LYMPHOCYTES NFR BLD AUTO: 20.9 %
MAGNESIUM SERPL-MCNC: 2.3 MG/DL (ref 1.6–2.4)
MCH RBC QN AUTO: 28.5 PG (ref 26–33)
MCHC RBC AUTO-ENTMCNC: 30.9 GM/DL (ref 32.2–35.5)
MCV RBC AUTO: 92.2 FL (ref 81–99)
MONOCYTES ABSOLUTE: 0.7 THOU/MM3 (ref 0.4–1.3)
MONOCYTES NFR BLD AUTO: 7 %
NEUTROPHILS ABSOLUTE: 6.6 THOU/MM3 (ref 1.8–7.7)
NEUTROPHILS NFR BLD AUTO: 69.2 %
NRBC BLD AUTO-RTO: 0 /100 WBC
PLATELET # BLD AUTO: 441 THOU/MM3 (ref 130–400)
PMV BLD AUTO: 9.5 FL (ref 9.4–12.4)
POTASSIUM SERPL-SCNC: 5.1 MEQ/L (ref 3.5–5.2)
PROGEST SERPL-MCNC: < 0.05 NG/ML
PROT SERPL-MCNC: 8 G/DL (ref 6.1–8)
PTH-INTACT SERPL-MCNC: 25.6 PG/ML (ref 15–65)
RBC # BLD AUTO: 4.46 MILL/MM3 (ref 4.2–5.4)
RHEUMATOID FACT SERPL-ACNC: < 10 IU/ML (ref 0–13)
SODIUM SERPL-SCNC: 138 MEQ/L (ref 135–145)
T4 FREE SERPL-MCNC: 1.04 NG/DL (ref 0.93–1.68)
TIBC SERPL-MCNC: 348 UG/DL (ref 171–450)
TRIGL SERPL-MCNC: 170 MG/DL (ref 0–199)
TSH SERPL DL<=0.005 MIU/L-ACNC: 1.91 UIU/ML (ref 0.4–4.2)
URATE SERPL-MCNC: 5.4 MG/DL (ref 2.4–5.7)
VIT B12 SERPL-MCNC: 586 PG/ML (ref 211–911)
WBC # BLD AUTO: 9.5 THOU/MM3 (ref 4.8–10.8)

## 2024-08-21 PROCEDURE — 1036F TOBACCO NON-USER: CPT | Performed by: FAMILY MEDICINE

## 2024-08-21 PROCEDURE — G8417 CALC BMI ABV UP PARAM F/U: HCPCS | Performed by: FAMILY MEDICINE

## 2024-08-21 PROCEDURE — 3079F DIAST BP 80-89 MM HG: CPT | Performed by: FAMILY MEDICINE

## 2024-08-21 PROCEDURE — G8427 DOCREV CUR MEDS BY ELIG CLIN: HCPCS | Performed by: FAMILY MEDICINE

## 2024-08-21 PROCEDURE — 99205 OFFICE O/P NEW HI 60 MIN: CPT | Performed by: FAMILY MEDICINE

## 2024-08-21 PROCEDURE — 3074F SYST BP LT 130 MM HG: CPT | Performed by: FAMILY MEDICINE

## 2024-08-21 RX ORDER — CALCIUM CARBONATE 260MG(650)
1 TABLET,CHEWABLE ORAL
COMMUNITY

## 2024-08-21 RX ORDER — MINOCYCLINE HYDROCHLORIDE 50 MG/1
CAPSULE ORAL
COMMUNITY
Start: 2024-08-12

## 2024-08-21 RX ORDER — LISINOPRIL 20 MG/1
TABLET ORAL
COMMUNITY
Start: 2024-08-12

## 2024-08-21 RX ORDER — PHENOL 1.4 %
1 AEROSOL, SPRAY (ML) MUCOUS MEMBRANE NIGHTLY
COMMUNITY

## 2024-08-21 RX ORDER — ATORVASTATIN CALCIUM 10 MG/1
TABLET, FILM COATED ORAL
COMMUNITY
Start: 2022-09-25

## 2024-08-21 RX ORDER — CHLORAL HYDRATE 500 MG
1 CAPSULE ORAL NIGHTLY
COMMUNITY

## 2024-08-21 RX ORDER — SPIRONOLACTONE 25 MG/1
TABLET ORAL
COMMUNITY
Start: 2024-08-11

## 2024-08-21 SDOH — ECONOMIC STABILITY: FOOD INSECURITY: WITHIN THE PAST 12 MONTHS, YOU WORRIED THAT YOUR FOOD WOULD RUN OUT BEFORE YOU GOT MONEY TO BUY MORE.: NEVER TRUE

## 2024-08-21 SDOH — ECONOMIC STABILITY: FOOD INSECURITY: WITHIN THE PAST 12 MONTHS, THE FOOD YOU BOUGHT JUST DIDN'T LAST AND YOU DIDN'T HAVE MONEY TO GET MORE.: NEVER TRUE

## 2024-08-21 SDOH — ECONOMIC STABILITY: INCOME INSECURITY: HOW HARD IS IT FOR YOU TO PAY FOR THE VERY BASICS LIKE FOOD, HOUSING, MEDICAL CARE, AND HEATING?: NOT VERY HARD

## 2024-08-21 ASSESSMENT — ENCOUNTER SYMPTOMS
SINUS PAIN: 1
APNEA: 1

## 2024-08-21 ASSESSMENT — PATIENT HEALTH QUESTIONNAIRE - PHQ9
SUM OF ALL RESPONSES TO PHQ9 QUESTIONS 1 & 2: 0
SUM OF ALL RESPONSES TO PHQ QUESTIONS 1-9: 0
1. LITTLE INTEREST OR PLEASURE IN DOING THINGS: NOT AT ALL
2. FEELING DOWN, DEPRESSED OR HOPELESS: NOT AT ALL

## 2024-08-22 LAB
C-REACTIVE PROTEIN, HIGH SENSITIVITY: 11.1 MG/L (ref 0–3)
DHEA-S SERPL-MCNC: 346 UG/DL (ref 35.4–256)
ESTRADIOL LEVEL: 36 PG/ML
FOLLICLE STIMULATING HORMONE: 12.9 MIU/ML
HOMOCYSTEINE: 10 UMOL/L (ref 0–15)
IGA SERPL-MCNC: 490 MG/DL (ref 70–400)
IGE SERPL-ACNC: 44 IU/ML (ref 0–100)
IGG SERPL-MCNC: 1403 MG/DL (ref 700–1600)
IGM SERPL-MCNC: 39 MG/DL (ref 40–230)
INSULIN SERPL-ACNC: 34.2 MU/L
LUTEINIZING HORMONE: 8.9 MIU/ML (ref 1.7–8.6)
T3 TOTAL: 107 NG/DL (ref 80–200)
T3FREE SERPL-MCNC: 3.2 PG/ML (ref 2–4.4)
T4 SERPL-MCNC: 6.3 UG/DL (ref 4.5–11.7)

## 2024-08-23 LAB
ACTH PLAS-MCNC: 25 PG/ML (ref 7.2–63.3)
GLIADIN IGG SER IA-ACNC: < 0.4 U/ML
THYROGLOBULIN: NORMAL
THYROPEROXIDASE AB SERPL IA-ACNC: < 4 IU/ML (ref 0–25)
TTG IGA SER IA-ACNC: 1 U/ML
TTG IGG SER IA-ACNC: < 0.6 U/ML

## 2024-08-24 LAB — NUCLEAR IGG SER QL IA: NORMAL

## 2024-08-25 LAB
COPPER SERPL-MCNC: 149.2 UG/DL (ref 80–155)
PYRIDOXAL PHOS SERPL-SCNC: 100.3 NMOL/L (ref 20–125)
SELENIUM SERPL-MCNC: 177.3 UG/L (ref 23–190)
ZINC SERPL-MCNC: 109 UG/DL (ref 60–120)

## 2024-08-26 LAB
VIT A SERPL-MCNC: NORMAL UG/ML
VITAMIN E LEVEL: NORMAL

## 2024-08-27 LAB
DHEA SERPL-MCNC: 2.97 NG/ML (ref 0.63–4.7)
TESTOSTERONE, FREE W SHGB, FEMALES/CHILDREN: NORMAL

## 2024-08-30 LAB — BIOTIN SERPL-MCNC: NORMAL PG/ML

## 2024-09-25 ENCOUNTER — TELEMEDICINE (OUTPATIENT)
Dept: FAMILY MEDICINE CLINIC | Age: 49
End: 2024-09-25
Payer: COMMERCIAL

## 2024-09-25 ENCOUNTER — HOSPITAL ENCOUNTER (OUTPATIENT)
Dept: MAMMOGRAPHY | Age: 49
Discharge: HOME | End: 2024-09-25
Payer: COMMERCIAL

## 2024-09-25 DIAGNOSIS — E08.00 DIABETES MELLITUS DUE TO UNDERLYING CONDITION WITH HYPEROSMOLARITY WITHOUT COMA, WITHOUT LONG-TERM CURRENT USE OF INSULIN (HCC): ICD-10-CM

## 2024-09-25 DIAGNOSIS — D24.2 PAPILLOMA OF LEFT BREAST: ICD-10-CM

## 2024-09-25 DIAGNOSIS — R76.8 LOW SERUM IGM FOR AGE: ICD-10-CM

## 2024-09-25 DIAGNOSIS — R76.8 HIGH TOTAL SERUM IGA: ICD-10-CM

## 2024-09-25 DIAGNOSIS — N12 PYELONEPHRITIS: ICD-10-CM

## 2024-09-25 DIAGNOSIS — G47.33 OBSTRUCTIVE SLEEP APNEA SYNDROME: ICD-10-CM

## 2024-09-25 DIAGNOSIS — J34.9: ICD-10-CM

## 2024-09-25 DIAGNOSIS — Z12.31 ENCOUNTER FOR SCREENING MAMMOGRAM FOR MALIGNANT NEOPLASM OF BREAST: ICD-10-CM

## 2024-09-25 DIAGNOSIS — R40.0 DAYTIME SOMNOLENCE: ICD-10-CM

## 2024-09-25 DIAGNOSIS — R06.02 SHORTNESS OF BREATH: Primary | ICD-10-CM

## 2024-09-25 DIAGNOSIS — Z12.31 VISIT FOR SCREENING MAMMOGRAM: ICD-10-CM

## 2024-09-25 DIAGNOSIS — J35.1 ENLARGED TONSILS: ICD-10-CM

## 2024-09-25 DIAGNOSIS — I10 PRIMARY HYPERTENSION: ICD-10-CM

## 2024-09-25 DIAGNOSIS — E66.01 MORBID OBESITY WITH BMI OF 40.0-44.9, ADULT: ICD-10-CM

## 2024-09-25 PROCEDURE — 99215 OFFICE O/P EST HI 40 MIN: CPT | Performed by: FAMILY MEDICINE

## 2024-09-25 PROCEDURE — G8428 CUR MEDS NOT DOCUMENT: HCPCS | Performed by: FAMILY MEDICINE

## 2024-09-25 PROCEDURE — 77063 BREAST TOMOSYNTHESIS BI: CPT

## 2024-11-19 ENCOUNTER — LAB (OUTPATIENT)
Dept: LAB | Age: 49
End: 2024-11-19

## 2024-12-04 LAB — CYTOLOGY THIN PREP PAP: NORMAL

## 2025-01-17 ENCOUNTER — LAB (OUTPATIENT)
Dept: LAB | Age: 50
End: 2025-01-17

## (undated) DEVICE — GLOVE SURG SZ 7 L12IN FNGR THK94MIL TRNSLUC YEL LTX HYDRGEL

## (undated) DEVICE — SHEET, T, LAPAROTOMY, STERILE: Brand: MEDLINE

## (undated) DEVICE — YANKAUER,BULB TIP,W/O VENT,RIGID,STERILE: Brand: MEDLINE

## (undated) DEVICE — TUBING, SUCTION, 1/4" X 12', STRAIGHT: Brand: MEDLINE

## (undated) DEVICE — SKIN AFFIX SURG ADHESIVE 72/CS 0.55ML: Brand: MEDLINE

## (undated) DEVICE — Z INACTIVE SYRINGE BLB IRR

## (undated) DEVICE — CHLORAPREP 26ML ORANGE

## (undated) DEVICE — SUTURE VCRL SZ 2-0 L27IN ABSRB UD L26MM SH 1/2 CIR J417H

## (undated) DEVICE — SUTURE VCRL SZ 3-0 L27IN ABSRB UD L26MM SH 1/2 CIR J416H

## (undated) DEVICE — SUTURE MCRYL SZ 4-0 L27IN ABSRB UD L19MM PS-2 1/2 CIR PRIM Y426H

## (undated) DEVICE — PACK PROCEDURE SURG PLAS SC MIN SRHP LF

## (undated) DEVICE — KENDALL SCD EXPRESS SLEEVES, KNEE LENGTH, MEDIUM: Brand: KENDALL SCD